# Patient Record
Sex: MALE | Race: BLACK OR AFRICAN AMERICAN | NOT HISPANIC OR LATINO | Employment: UNEMPLOYED | URBAN - METROPOLITAN AREA
[De-identification: names, ages, dates, MRNs, and addresses within clinical notes are randomized per-mention and may not be internally consistent; named-entity substitution may affect disease eponyms.]

---

## 2018-12-21 ENCOUNTER — OCCUPATIONAL MEDICINE (OUTPATIENT)
Dept: URGENT CARE | Facility: CLINIC | Age: 23
End: 2018-12-21
Payer: COMMERCIAL

## 2018-12-21 VITALS
DIASTOLIC BLOOD PRESSURE: 70 MMHG | HEIGHT: 68 IN | HEART RATE: 86 BPM | TEMPERATURE: 97.9 F | RESPIRATION RATE: 14 BRPM | BODY MASS INDEX: 33.34 KG/M2 | WEIGHT: 220 LBS | SYSTOLIC BLOOD PRESSURE: 126 MMHG

## 2018-12-21 DIAGNOSIS — S90.32XA CONTUSION OF LEFT FOOT, INITIAL ENCOUNTER: ICD-10-CM

## 2018-12-21 PROCEDURE — 99202 OFFICE O/P NEW SF 15 MIN: CPT | Performed by: FAMILY MEDICINE

## 2018-12-21 NOTE — LETTER
"EMPLOYEE’S CLAIM FOR COMPENSATION/ REPORT OF INITIAL TREATMENT  FORM C-4    EMPLOYEE’S CLAIM - PROVIDE ALL INFORMATION REQUESTED   First Name  Raymundo Last Name  Mahogany Birthdate                    1995                Sex  male Claim Number   Home Address  [address] Age  23 y.o. Height  1.727 m (5' 8\") Weight  99.8 kg (220 lb) [facility]     [facility] Zip  [address] Telephone  [phone] (home)    Mailing Address  [address] [facility] Zip  [address] Primary Language Spoken  English    Insurer  [facility]  Third Party   [employer]   Employee's Occupation (Job Title) When Injury or Occupational Disease Occurred      Employer's Name  [employer]  Telephone  [phone]    Employer Address  [address]  [facility]  Zip  [address]    Date of Injury  12/21/2018               Hour of Injury  4:50 PM Date Employer Notified  12/21/2018 Last Day of Work after Injury or Occupational Disease  12/21/2018 Supervisor to Whom Injury Reported  [facility]   Address or Location of Accident (if applicable)  [[place]]   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 (Be specific an answer in detail. Use additional sheet if necessary)  while operating electric pallet nickie, I turned my head while the nickie was in motion to make sure no traffic and pallet nickie spead up & ran over left foot   If you believe that you have an occupational disease, when did you first have knowledge of the disability and it relationship to your employment?  n/a Witnesses to the Accident  codyxiomara waynees      Nature of Injury or Occupational Disease  Strain  Part(s) of Body Injured or Affected  Ankle (L), ,     I certify that the above is true and correct to the best of my knowledge and " that I have provided this information in order to obtain the benefits of [place]  I hereby authorize any physician, chiropractor, surgeon, practitioner, or other person, any hospital, including [facility] or [facility] hospital, any medical service organization, any insurance company, or other institution or organization to release to each other, any medical or other information, including benefits paid or payable, pertinent to this injury or disease, except information relative to diagnosis, treatment and/or counseling for AIDS, psychological conditions, alcohol or controlled substances, for which I must give specific authorization.  A Photostat of this authorization shall be as valid as the original.     Date   Place   Employee’s Signature   THIS REPORT MUST BE COMPLETED AND MAILED WITHIN 3 WORKING DAYS OF TREATMENT   Place  [facility]  Name of Facility  [facility]   Date  12/21/2018 Diagnosis  (S90.32XA) Contusion of left foot, initial encounter Is there evidence the injured employee was under the influence of alcohol and/or another controlled substance at the time of accident?   Hour  5:56 PM Description of Injury or Disease  The encounter diagnosis was Contusion of left foot, initial encounter. No   Treatment  Walking boot, otc nsaid and ice as needed  Have you advised the patient to remain off work five days or more? No   X-Ray Findings  Negative   If Yes   From Date  To Date      From information given by the employee, together with medical evidence, can you directly connect this injury or occupational disease as job incurred?  Yes If No Full Duty  No Modified Duty  Yes   Is additional medical care by a physician indicated?  Yes If Modified Duty, Specify any Limitations / Restrictions  Walking limited to 4hr  Lifting carrying limited 25#   Do you know of any previous injury or  "disease contributing to this condition or occupational disease?                            No  Comments:prior distal tibia fracture with intact hardware on xray   Date  12/22/2018 Print Doctor’s Name Alexandre Gallegos M.D. I certify the employer’s copy of  this form was mailed on:   Address  [address], [address] Insurer’s Use Only     [facility] Zip  [address]    Provider’s Tax ID Number  [id number] Telephone  Dept: [phone]        ro-ALEXANDRE Spaulding M.D.   e-Signature: Dr. Sha Berumen, Medical Director Degree  MD        ORIGINAL-TREATING PHYSICIAN OR CHIROPRACTOR    PAGE 2-INSURER/TPA    PAGE 3-EMPLOYER    PAGE 4-EMPLOYEE             Form C-4 (rev10/07)              BRIEF DESCRIPTION OF RIGHTS AND BENEFITS  (Pursuant to [place]RS 616C.050)    Notice of Injury or Occupational Disease (Incident Report Form C-1): If an injury or occupational disease (OD) arises out of and in the  course of employment, you must provide written notice to your employer as soon as practicable, but no later than 7 days after the accident or  OD. Your employer shall maintain a sufficient supply of the required forms.    Claim for Compensation (Form C-4): If medical treatment is sought, the form C-4 is available at the place of initial treatment. A completed  \"Claim for Compensation\" (Form C-4) must be filed within 90 days after an accident or OD. The treating physician or chiropractor must,  within 3 working days after treatment, complete and mail to the employer, the employer's insurer and third-party , the Claim for  Compensation.    Medical Treatment: If you require medical treatment for your on-the-job injury or OD, you may be required to select a physician or  chiropractor from a list provided by your workers’ compensation insurer, if it has contracted with an Organization for Managed Care (MCO) or  Preferred Provider Organization (PPO) or providers of health care. If your employer has not entered into a " contract with an MCO or PPO, you  may select a physician or chiropractor from the Panel of Physicians and Chiropractors. Any medical costs related to your industrial injury or  OD will be paid by your insurer.    Temporary Total Disability (TTD): If your doctor has certified that you are unable to work for a period of at least 5 consecutive days, or 5  cumulative days in a 20-day period, or places restrictions on you that your employer does not accommodate, you may be entitled to TTD  compensation.    Temporary Partial Disability (TPD): If the wage you receive upon reemployment is less than the compensation for TTD to which you are  entitled, the insurer may be required to pay you TPD compensation to make up the difference. TPD can only be paid for a maximum of 24  months.    Permanent Partial Disability (PPD): When your medical condition is stable and there is an indication of a PPD as a result of your injury or  OD, within 30 days, your insurer must arrange for an evaluation by a rating physician or chiropractor to determine the degree of your PPD. The  amount of your PPD award depends on the date of injury, the results of the PPD evaluation and your age and wage.    Permanent Total Disability (PTD): If you are medically certified by a treating physician or chiropractor as permanently and totally disabled  and have been granted a PTD status by your insurer, you are entitled to receive monthly benefits not to exceed 66 2/3% of your average  monthly wage. The amount of your PTD payments is subject to reduction if you previously received a PPD award.    Vocational Rehabilitation Services: You may be eligible for vocational rehabilitation services if you are unable to return to the job due to a  permanent physical impairment or permanent restrictions as a result of your injury or occupational disease.    Transportation and Per Alison Reimbursement: You may be eligible for travel expenses and per alison associated with  medical treatment.    Reopening: You may be able to reopen your claim if your condition worsens after claim closure.    Appeal Process: If you disagree with a written determination issued by the insurer or the insurer does not respond to your request, you may  appeal to the Department of Administration, , by following the instructions contained in your determination letter. You must  appeal the determination within 70 days from the date of the determination letter at [address], [address], [place], [place]  [address], or [address], [place], [place] [address]. If you disagree with the  decision, you may appeal to the  Department of Administration, . You must file your appeal within 30 days from the date of the  decision  letter at [address], [address], [place], [place] [address], or [address], [place], [place] [address]. If you  disagree with a decision of an , you may file a petition for judicial review with the District Court. You must do so within 30  days of the Appeal Officer’s decision. You may be represented by an  at your own expense or you may contact the [facility] for possible  representation.    [place]  for Injured Workers (NAIW): If you disagree with a  decision, you may request that NAIW represent you  without charge at an  Hearing. For information regarding denial of benefits, you may contact the [facility] at: [address], [address], [place] [address], [phone], or [address], [address], [place] [address], [phone]    To File a Complaint with the Division: If you wish to file a complaint with the  of the Division of Industrial Relations (DIR),  please contact the Workers’ Compensation Section, [address], [address], [place], [place] [address], telephone [phone],  or  [address], [address], [place], [place] [address], telephone [phone].    For assistance with Workers’ Compensation Issues: you may contact the Office of the Governor Consumer Health Assistance, [address] [place] [address], [address], [place], [place] [address], Toll Free 1-122.477.3204, Web site: http://[facility].Count includes the [facility].[place]., E-mail  [email or web address].Count includes the [facility].[place].                                                                                                                                                                                                                                   __________________________________________________________________                                                                   _________________                Employee Name / Signature                                                                                                                                                       Date                                                                                                                                                                                                     D-2 (rev. 10/07)

## 2018-12-22 ENCOUNTER — HOSPITAL ENCOUNTER (OUTPATIENT)
Dept: RADIOLOGY | Facility: MEDICAL CENTER | Age: 23
End: 2018-12-22
Attending: FAMILY MEDICINE
Payer: COMMERCIAL

## 2018-12-22 DIAGNOSIS — S99.922A FOOT INJURY, LEFT, INITIAL ENCOUNTER: ICD-10-CM

## 2018-12-22 PROCEDURE — 73630 X-RAY EXAM OF FOOT: CPT | Mod: LT

## 2018-12-26 ENCOUNTER — OCCUPATIONAL MEDICINE (OUTPATIENT)
Dept: URGENT CARE | Facility: PHYSICIAN GROUP | Age: 23
End: 2018-12-26
Payer: COMMERCIAL

## 2018-12-26 VITALS
WEIGHT: 220 LBS | SYSTOLIC BLOOD PRESSURE: 96 MMHG | HEART RATE: 76 BPM | RESPIRATION RATE: 16 BRPM | OXYGEN SATURATION: 95 % | DIASTOLIC BLOOD PRESSURE: 60 MMHG | HEIGHT: 68 IN | TEMPERATURE: 98.2 F | BODY MASS INDEX: 33.34 KG/M2

## 2018-12-26 DIAGNOSIS — S90.32XD CONTUSION OF LEFT FOOT, SUBSEQUENT ENCOUNTER: ICD-10-CM

## 2018-12-26 PROCEDURE — 99213 OFFICE O/P EST LOW 20 MIN: CPT | Mod: 29 | Performed by: PHYSICIAN ASSISTANT

## 2018-12-26 NOTE — PATIENT INSTRUCTIONS
Contusion  Introduction  A contusion is a deep bruise. Contusions happen when an injury causes bleeding under the skin. Symptoms of bruising include pain, swelling, and discolored skin. The skin may turn blue, purple, or yellow.  Follow these instructions at home:  · Rest the injured area.  · If told, put ice on the injured area.  ¨ Put ice in a plastic bag.  ¨ Place a towel between your skin and the bag.  ¨ Leave the ice on for 20 minutes, 2-3 times per day.  · If told, put light pressure (compression) on the injured area using an elastic bandage. Make sure the bandage is not too tight. Remove it and put it back on as told by your doctor.  · If possible, raise (elevate) the injured area above the level of your heart while you are sitting or lying down.  · Take over-the-counter and prescription medicines only as told by your doctor.  Contact a doctor if:  · Your symptoms do not get better after several days of treatment.  · Your symptoms get worse.  · You have trouble moving the injured area.  Get help right away if:  · You have very bad pain.  · You have a loss of feeling (numbness) in a hand or foot.  · Your hand or foot turns pale or cold.  This information is not intended to replace advice given to you by your health care provider. Make sure you discuss any questions you have with your health care provider.  Document Released: 06/05/2009 Document Revised: 05/25/2017 Document Reviewed: 05/04/2016  © 2017 Elsevier     - - -

## 2018-12-26 NOTE — LETTER
[facility] [address] [address]  Phone:  [phone] - Fax:  [phone]   [facility] Progress Report and Disability Certification  Date of Service: 12/26/2018   No Show:  No  Date / Time of Next Visit: 1/2/2019   Claim Information   Patient Name: Raymundo Vides  Claim Number:     Employer: [employer]  Date of Injury: 12/21/2018     Insurer / TPA: [employer]  ID / SSN:     Occupation:   Diagnosis: The encounter diagnosis was Contusion of left foot, subsequent encounter.    Medical Information   Related to Industrial Injury? Yes    Subjective Complaints:  DOI: 12/21/18. Left foot ran over by pallgilberto fu. 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 . The patient was seen in urgent care on the date of injury.  X-ray was negative for fracture.  He was placed in a boot and given work restrictions.  He reports that he has been wearing the boot full-time.  He feels that the foot is improved about 75%.  After standing for approximately 4 hours he will begin to experience pain.  He is no longer taking any over-the-counter medication.   Objective Findings: Left foot: No obvious deformity.  Mild soft tissue swelling over the lateral aspect.  Mild tenderness over the anterior talofibular ligament.  No bony tenderness.  Negative anterior drawer.  Distal neurovascular intact   Pre-Existing Condition(s):     Assessment:   Condition Improved    Status: Additional Care Required  Permanent Disability:No    Plan:      Diagnostics: X-ray    Comments:  X-ray taken on 12/21/18 was without fracture    Disability Information   Status: Released to Restricted Duty    From:  12/26/2018  Through: 1/2/2019 Restrictions are: Temporary   Physical Restrictions   Sitting:    Standing:  < or = to 4 hrs/day Stooping:    Bending:      Squatting:     Walking:  < or = to 4 hrs/day Climbing:    Pushing:      Pulling:    Other:    Reaching Above Shoulder (L):   Reaching Above Shoulder (R):       Reaching Below Shoulder (L):    Reaching Below Shoulder (R):      Not to exceed Weight Limits   Carrying(hrs):   Weight Limit(lb): < or = to 25 pounds Lifting(hrs):   Weight  Limit(lb): < or = to 25 pounds   Comments: Patient reports approximate 75% improvement but not complete resolution of symptoms.  He will remain in the boot.  Work restrictions will remain the same.  Patient will return for follow-up in 1 week.    Repetitive Actions   Hands: i.e. Fine Manipulations from Grasping:     Feet: i.e. Operating Foot Controls: 0 hrs/day  Comments:Restriction to left foot only   Driving / Operate Machinery:     Physician Name: Ofelia Medrano P.A.-C. Physician Signature: OFELIA Sagastume P.A.-C. e-Signature: Dr. Sha Berumen, Medical Director   Clinic Name / Location: [address] [address] Clinic Phone Number: Dept: [phone]   Appointment Time: 11:00 Am Visit Start Time: 11:21 AM   Check-In Time:  11:07 Am Visit Discharge Time:  12:10 PM   Original-Treating Physician or Chiropractor    Page 2-Insurer/TPA    Page 3-Employer    Page 4-Employee

## 2018-12-26 NOTE — PROGRESS NOTES
"Subjective:   Raymundo Vides is a 23 y.o. male who presents for Ankle Injury (WC/FV ankle strain)      DOI: 12/21/18. Left foot ran over by naveen fu. 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 . The patient was seen in urgent care on the date of injury.  X-ray was negative for fracture.  He was placed in a boot and given work restrictions.  He reports that he has been wearing the boot full-time.  He feels that the foot is improved about 75%.  After standing for approximately 4 hours he will begin to experience pain.  He is no longer taking any over-the-counter medication.Ankle Injury        Review of Systems   Musculoskeletal: Positive for joint pain.   All other systems reviewed and are negative.       Objective:   BP (!) 96/60 (BP Location: Left arm, Patient Position: Sitting, BP Cuff Size: Adult)   Pulse 76   Temp 36.8 °C (98.2 °F) (Temporal)   Resp 16   Ht 1.727 m (5' 8\")   Wt 99.8 kg (220 lb)   SpO2 95%   BMI 33.45 kg/m²   Physical Exam   Constitutional: He is oriented to person, place, and time. He appears well-developed and well-nourished.   HENT:   Head: Normocephalic and atraumatic.   Right Ear: External ear normal.   Left Ear: External ear normal.   Nose: Nose normal.   Mouth/Throat: Oropharynx is clear and moist.   Eyes: Pupils are equal, round, and reactive to light. Conjunctivae and EOM are normal.   Neck: Normal range of motion. Neck supple.   Cardiovascular: Normal rate, regular rhythm and normal heart sounds.    Pulses:       Dorsalis pedis pulses are 2+ on the left side.        Posterior tibial pulses are 2+ on the left side.   Pulmonary/Chest: Effort normal and breath sounds normal. No respiratory distress.   Abdominal: There is no tenderness.   Musculoskeletal: Normal range of motion.        Left foot: There is tenderness and swelling. There is no bony tenderness, no crepitus and no deformity. "        Feet:    Neurological: He is alert and oriented to person, place, and time.   Skin: Skin is warm and dry. No rash noted.   Psychiatric: He has a normal mood and affect. Judgment normal.     Left foot: No obvious deformity.  Mild soft tissue swelling over the lateral aspect.  Mild tenderness over the anterior talofibular ligament.  No bony tenderness.  Negative anterior drawer.  Distal neurovascular intact    Assessment/Plan:     1. Contusion of left foot, subsequent encounter    Patient reports approximate 75% improvement but not complete resolution of symptoms.  He will remain in the boot.  Work restrictions will remain the same.  Patient will return for follow-up in 1 week. Report called to Nahum stevens [facility].     Differential diagnosis, natural history, supportive care, and indications for immediate follow-up discussed.    If not improving in 3-5 days, F/U with PCP or return to  or sooner if worsens  Strict ER precautions given.    Please note that this note was created using voice recognition speech to text software. Every effort has been made to correct obvious errors.  However, I expect there are errors of grammar and possibly context that were not discovered prior to finalizing the note

## 2018-12-31 ASSESSMENT — ENCOUNTER SYMPTOMS
ROS SKIN COMMENTS: NO ABRASION OR LACERATION
SENSORY CHANGE: 0
FOCAL WEAKNESS: 0

## 2018-12-31 NOTE — PROGRESS NOTES
"Subjective:      Raymundo Vides is a 23 y.o. male who presents with Ankle Injury (x LT ankle pain)      DOI: 12/21/2018  Left foot ran over by naveen fu. 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 Pain severity 3/10 at rest. Moderate with ambulation. Limping. Prior fracture and surgery of left foot when he was 12 or 14y/o. No laceration or abrasion. No OTC medications.      HPI    Review of Systems   Skin:        No abrasion or laceration     Neurological: Negative for sensory change and focal weakness.          Objective:     /70 (BP Location: Left arm, Patient Position: Sitting, BP Cuff Size: Large adult)   Pulse 86   Temp 36.6 °C (97.9 °F) (Temporal)   Resp 14   Ht 1.727 m (5' 8\")   Wt 99.8 kg (220 lb)   BMI 33.45 kg/m²      Physical Exam   Constitutional: He is oriented to person, place, and time. He appears well-developed and well-nourished. No distress.   HENT:   Head: Normocephalic and atraumatic.   Neurological: He is alert and oriented to person, place, and time.   Skin: Skin is warm and dry.       Left foot: tender proximal/dorsal aspect. Mild STS. Skin intact. No malleolar tenderness. Ankle joint stable.        Assessment/Plan:   X-ray per radiology:  No acute osseous abnormality.    1. Contusion of left foot, initial encounter    Patient was placed in a walking boot the evening prior to x-ray.  He may continue to use this as needed.  Relative rest, ice, NSAID.  Work restrictions on [address].      "

## 2019-01-02 ENCOUNTER — OCCUPATIONAL MEDICINE (OUTPATIENT)
Dept: URGENT CARE | Facility: PHYSICIAN GROUP | Age: 24
End: 2019-01-02
Payer: COMMERCIAL

## 2019-01-02 VITALS
HEART RATE: 80 BPM | BODY MASS INDEX: 32.08 KG/M2 | RESPIRATION RATE: 20 BRPM | DIASTOLIC BLOOD PRESSURE: 74 MMHG | OXYGEN SATURATION: 98 % | TEMPERATURE: 97 F | SYSTOLIC BLOOD PRESSURE: 118 MMHG | WEIGHT: 211 LBS

## 2019-01-02 DIAGNOSIS — S90.32XA CONTUSION OF LEFT FOOT, INITIAL ENCOUNTER: ICD-10-CM

## 2019-01-02 PROCEDURE — 99212 OFFICE O/P EST SF 10 MIN: CPT | Performed by: FAMILY MEDICINE

## 2019-01-02 NOTE — LETTER
[facility] [address] [address]  Phone:  [phone] - Fax:  [phone]   [facility] Progress Report and Disability Certification  Date of Service: 1/2/2019   No Show:  No  Date / Time of Next Visit: 1/6/2019   Claim Information   Patient Name: Raymundo Vides  Claim Number:     Employer: [employer]  Date of Injury: 12/21/2018     Insurer / TPA: [employer]  ID / SSN:     Occupation:   Diagnosis: The encounter diagnosis was Contusion of left foot, initial encounter.    Medical Information   Related to Industrial Injury? Yes    Subjective Complaints:  Date of injury: 12/21/2018  Patient is here for follow-up on left foot contusion that happened almost 2 weeks ago.  He is feeling a lot better.  He continues to wear his boot when he is working and when he is up and about.  Denies any numbness.  Not taking any medication for pain   Objective Findings: Physical Exam   Constitutional: He is oriented to person, place, and time. He appears well-developed and well-nourished. No distress.   Eyes: No scleral icterus.   Cardiovascular: Normal rate.    Pulmonary/Chest: Effort normal. No respiratory distress.   Musculoskeletal:        Left ankle: Normal.        Left foot: Normal. There is normal range of motion, no tenderness, no bony tenderness, no swelling, normal capillary refill, no crepitus, no deformity and no laceration.   Full range of motion left foot and ankle, no swelling, bruising or erythema noted.   Neurological: He is alert and oriented to person, place, and time.   Skin: Skin is warm. He is not diaphoretic. No pallor.   Psychiatric: He has a normal mood and affect.      Pre-Existing Condition(s):     Assessment:   Condition Improved    Status: Additional Care Required  Permanent Disability:No    Plan:      Diagnostics:      Comments:       Disability Information   Status: Released to Full Duty    From:   1/2/2019  Through: 1/6/2019 Restrictions are: Temporary   Physical Restrictions   Sitting:    Standing:    Stooping:    Bending:      Squatting:    Walking:    Climbing:    Pushing:      Pulling:    Other:    Reaching Above Shoulder (L):   Reaching Above Shoulder (R):       Reaching Below Shoulder (L):    Reaching Below Shoulder (R):      Not to exceed Weight Limits   Carrying(hrs):   Weight Limit(lb):   Lifting(hrs):   Weight  Limit(lb):     Comments: Patient significantly better, exam is otherwise normal  Advised to take off boot  Recommend full duty and repeat evaluation by Sunday January 6    Repetitive Actions   Hands: i.e. Fine Manipulations from Grasping:     Feet: i.e. Operating Foot Controls:     Driving / Operate Machinery:     Physician Name: Raffaele Hahn M.D. Physician Signature: RAFFAELE Grace M.D. e-Signature: Dr. Sha Berumen, Medical Director   Clinic Name / Location: [address] [address] Clinic Phone Number: Dept: [phone]   Appointment Time: 6:40 Pm Visit Start Time: 6:38 PM   Check-In Time:  6:22 Pm Visit Discharge Time:  7:14 PM   Original-Treating Physician or Chiropractor    Page 2-Insurer/TPA    Page 3-Employer    Page 4-Employee

## 2019-01-03 NOTE — PROGRESS NOTES
Physical Exam   Constitutional: He is oriented to person, place, and time. He appears well-developed and well-nourished. No distress.   Eyes: No scleral icterus.   Cardiovascular: Normal rate.    Pulmonary/Chest: Effort normal. No respiratory distress.   Musculoskeletal:        Left ankle: Normal.        Left foot: Normal. There is normal range of motion, no tenderness, no bony tenderness, no swelling, normal capillary refill, no crepitus, no deformity and no laceration.   Full range of motion left foot and ankle, no swelling, bruising or erythema noted.   Neurological: He is alert and oriented to person, place, and time.   Skin: Skin is warm. He is not diaphoretic. No pallor.   Psychiatric: He has a normal mood and affect.

## 2019-01-03 NOTE — PROGRESS NOTES
Subjective:      Raymundo Vides is a 23 y.o. male who presents with Ankle Injury (WC FV, left ankle )      Date of injury: 12/21/2018  Patient is here for follow-up on left foot contusion that happened almost 2 weeks ago.  He is feeling a lot better.  He continues to wear his boot when he is working and when he is up and about.  Denies any numbness.  Not taking any medication for pain     HPI    ROS       Objective:     /74   Pulse 80   Temp 36.1 °C (97 °F)   Resp 20   Wt 95.7 kg (211 lb)   SpO2 98%   BMI 32.08 kg/m²      Physical Exam    Physical Exam   Constitutional: He is oriented to person, place, and time. He appears well-developed and well-nourished. No distress.   Eyes: No scleral icterus.   Cardiovascular: Normal rate.    Pulmonary/Chest: Effort normal. No respiratory distress.   Musculoskeletal:        Left ankle: Normal.        Left foot: Normal. There is normal range of motion, no tenderness, no bony tenderness, no swelling, normal capillary refill, no crepitus, no deformity and no laceration.   Full range of motion left foot and ankle, no swelling, bruising or erythema noted.   Neurological: He is alert and oriented to person, place, and time.   Skin: Skin is warm. He is not diaphoretic. No pallor.   Psychiatric: He has a normal mood and affect.          Assessment/Plan:     1. Contusion of left foot, initial encounter    Significantly improved  Recommend taking off the boot  Recommend also a trial of full duty and follow-up on January 6 for repeat evaluation

## 2019-01-07 ENCOUNTER — OCCUPATIONAL MEDICINE (OUTPATIENT)
Dept: URGENT CARE | Facility: PHYSICIAN GROUP | Age: 24
End: 2019-01-07
Payer: COMMERCIAL

## 2019-01-07 VITALS
TEMPERATURE: 97 F | RESPIRATION RATE: 18 BRPM | SYSTOLIC BLOOD PRESSURE: 110 MMHG | BODY MASS INDEX: 32.54 KG/M2 | HEART RATE: 68 BPM | WEIGHT: 214 LBS | OXYGEN SATURATION: 96 % | DIASTOLIC BLOOD PRESSURE: 78 MMHG

## 2019-01-07 DIAGNOSIS — S90.32XD CONTUSION OF LEFT FOOT, SUBSEQUENT ENCOUNTER: ICD-10-CM

## 2019-01-07 PROCEDURE — 99213 OFFICE O/P EST LOW 20 MIN: CPT | Mod: 29 | Performed by: PHYSICIAN ASSISTANT

## 2019-01-07 NOTE — PROGRESS NOTES
Subjective:      Raymundo Vides is a 23 y.o. male who presents with Ankle Injury (WC FV left ankle)      DOI: 12/21/2018  Left foot ran over by [employer]. 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 X-rays were negative for fracture.  Patient returns to clinic today stating: In general he is doing well.  He approximate 80% improved from time of injury.  He notes remote history of childhood fracture to left ankle that did have some surgical intervention.  He denies issues of pain or limitation with this ankle since.  He denies using ice elevation or over-the-counter medications lately for this.  He feels as though it is improving.  He denies other current employment.     Ankle Injury          ROS       Objective:     /78   Pulse 68   Temp 36.1 °C (97 °F)   Resp 18   Wt 97.1 kg (214 lb)   SpO2 96%   BMI 32.54 kg/m²      Physical Exam    Gen: AOx3; Head: NC AT; Eyes: PERRLA/EOM; Lungs: NLR; Cardiac: RR by periph pulse exam; left foot/ankle: Grossly normal no erythema ecchymosis effusions or edema noted, full active range of motion, no bony tenderness to palpation; neuro: N VID, brisk capillary refill, +2 dorsalis pedis pulse palpated, nonantalgic gait       Assessment/Plan:     1. Contusion of left foot, subsequent encounter  Continue full duty without restriction, OTC Tylenol/NSAIDs when painful, ice and elevate regularly, follow-up in 1 week for likely MMI

## 2019-01-07 NOTE — LETTER
[facility] [address] [address]  Phone:  [phone] - Fax:  [phone]   [facility] Progress Report and Disability Certification  Date of Service: 1/7/2019   No Show:  No  Date / Time of Next Visit: 1/14/2019   Claim Information   Patient Name: Raymundo Vides  Claim Number:     Employer: [employer] \ Date of Injury: 12/21/2018     Insurer / TPA: [employer] \ ID / SSN:     Occupation:  \ Diagnosis: The encounter diagnosis was Contusion of left foot, subsequent encounter.    Medical Information   Related to Industrial Injury? Yes \   Subjective Complaints:  DOI: 12/21/2018  Left foot ran over by [employer]. 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 X-rays were negative for fracture.  Patient returns to clinic today stating: In general he is doing well.  He approximate 80% improved from time of injury.  He notes remote history of childhood fracture to left ankle that did have some surgical intervention.  He denies issues of pain or limitation with this ankle since.  He denies using ice elevation or over-the-counter medications lately for this.  He feels as though it is improving.  He denies other current employment.   Objective Findings: Gen: AOx3; Head: NC AT; Eyes: PERRLA/EOM; Lungs: NLR; Cardiac: RR by periph pulse exam; left foot/ankle: Grossly normal no erythema ecchymosis effusions or edema noted, full active range of motion, no bony tenderness to palpation; neuro: N VID, brisk capillary refill, +2 dorsalis pedis pulse palpated, nonantalgic gait   Pre-Existing Condition(s):     Assessment:   Initial Visit    Status: Additional Care Required  Permanent Disability:No    Plan:   Comments:Continue full duty without restriction, OTC Tylenol/NSAIDs when painful, ice and elevate regularly, follow-up in 1 week for likely  MMI     Diagnostics:      Comments:  Continue full duty without restriction, OTC Tylenol/NSAIDs when painful, ice and elevate regularly, follow-up in 1 week for likely MMI     Disability Information   Status: Released to Full Duty    From:  1/7/2019  Through: 1/14/2019 Restrictions are:     Physical Restrictions   Sitting:    Standing:    Stooping:    Bending:      Squatting:    Walking:    Climbing:    Pushing:      Pulling:    Other:    Reaching Above Shoulder (L):   Reaching Above Shoulder (R):       Reaching Below Shoulder (L):    Reaching Below Shoulder (R):      Not to exceed Weight Limits   Carrying(hrs):   Weight Limit(lb):   Lifting(hrs):   Weight  Limit(lb):     Comments: Continue full duty without restriction, OTC Tylenol/NSAIDs when painful, ice and elevate regularly, follow-up in 1 week for likely MMI     Repetitive Actions   Hands: i.e. Fine Manipulations from Grasping:     Feet: i.e. Operating Foot Controls:     Driving / Operate Machinery:     Physician Name: Horace Lima P.A.-C. Physician Signature: HORACE Viramontes P.A.-C. e-Signature: Dr. Sha Berumen, Medical Director   Clinic Name / Location: [facility] [address] [address] Clinic Phone Number: Dept: [phone]   Appointment Time: 1:00 Pm Visit Start Time: 1:41 PM   Check-In Time:  12:57 Pm Visit Discharge Time:  2:20PM   Original-Treating Physician or Chiropractor    Page 2-Insurer/TPA    Page 3-Employer    Page 4-Employee

## 2019-01-15 ENCOUNTER — OCCUPATIONAL MEDICINE (OUTPATIENT)
Dept: URGENT CARE | Facility: PHYSICIAN GROUP | Age: 24
End: 2019-01-15
Payer: COMMERCIAL

## 2019-01-15 VITALS
HEART RATE: 88 BPM | WEIGHT: 214 LBS | OXYGEN SATURATION: 96 % | TEMPERATURE: 98 F | SYSTOLIC BLOOD PRESSURE: 110 MMHG | DIASTOLIC BLOOD PRESSURE: 80 MMHG | BODY MASS INDEX: 32.54 KG/M2

## 2019-01-15 DIAGNOSIS — S90.32XD CONTUSION OF LEFT FOOT, SUBSEQUENT ENCOUNTER: ICD-10-CM

## 2019-01-15 PROCEDURE — 99213 OFFICE O/P EST LOW 20 MIN: CPT | Performed by: EMERGENCY MEDICINE

## 2019-01-15 ASSESSMENT — ENCOUNTER SYMPTOMS
EYE REDNESS: 0
CHILLS: 0
TINGLING: 0
COUGH: 0
FEVER: 0
FOCAL WEAKNESS: 0
NERVOUS/ANXIOUS: 0
EYE DISCHARGE: 0

## 2019-01-15 NOTE — PROGRESS NOTES
Subjective:      Raymundo Vides is a 23 y.o. male who presents with Ankle Injury (WC FV left ankle)      Date of injury 12/21/2018.  Patient is a 23-year-old male here for follow-up after injury to his left foot when he was run over by a pallet nickie.  Patient has been followed in the urgent care since the accident last seen on 1/7/2019.  At that time he was 80% improved he states that he is now 95% improved previous x-rays have been negative for fracture dislocation.  Patient is currently doing full duty stating that the only discomfort is when jumping down on his feet     HPI  PMH:  has no past medical history on file.  MEDS: No current outpatient prescriptions on file.  ALLERGIES: No Known Allergies  SURGHX: No past surgical history on file.  SOCHX:  reports that he has been smoking.  He has never used smokeless tobacco. He reports that he drinks alcohol. He reports that he does not use drugs.  FH: Reviewed with patient, not pertinent to this visit.   Review of Systems   Constitutional: Negative for chills and fever.   Eyes: Negative for discharge and redness.   Respiratory: Negative for cough.    Cardiovascular: Negative for chest pain.   Musculoskeletal: Negative for joint pain.        Patient states he has 95% resolution of his left foot pain.   Skin: Negative for rash.   Neurological: Negative for tingling and focal weakness.   Psychiatric/Behavioral: The patient is not nervous/anxious.           Objective:     /80   Pulse 88   Temp 36.7 °C (98 °F)   Wt 97.1 kg (214 lb)   SpO2 96%   BMI 32.54 kg/m²      Physical Exam    Vital signs are 110/80, pulse of 88 and regular, temperature is 36.7 with a pulse oximetry 96%.  Examination of patient's left ankle reveals normal exam examination of patient's left foot shows no evidence of any edema patient has good range of motion able to ambulate without difficulty.  Normal neurovascular exam of the ankle and       Assessment/Plan:     1. Contusion of left  foot, subsequent encounter      Please refer to the [place] LD-39 form patient be reevaluated in 1 week while on full duty.

## 2019-01-15 NOTE — LETTER
[facility] [address] [address]  Phone:  [phone] - Fax:  [phone]   [facility] Progress Report and Disability Certification  Date of Service: 1/15/2019   No Show:  No  Date / Time of Next Visit: 1/22/2019   Claim Information   Patient Name: Raymundo Vides  Claim Number:     Employer: [employer]  Date of Injury: 12/21/2018     Insurer / TPA: [employer]  ID / SSN:     Occupation:   Diagnosis: The encounter diagnosis was Contusion of left foot, subsequent encounter.    Medical Information   Related to Industrial Injury? Yes    Subjective Complaints:  Date of injury 12/21/2018.  Patient is a 23-year-old male here for follow-up after injury to his left foot when he was run over by a pallet nickie.  Patient has been followed in the urgent care since the accident last seen on 1/7/2019.  At that time he was 80% improved he states that he is now 95% improved previous x-rays have been negative for fracture dislocation.  Patient is currently doing full duty stating that the only discomfort is when jumping down on his feet   Objective Findings: Vital signs are 110/80, pulse of 88 and regular, temperature is 36.7 with a pulse oximetry 96%.  Examination of patient's left ankle reveals normal exam examination of patient's left foot shows no evidence of any edema patient has good range of motion able to ambulate without difficulty.  Normal neurovascular exam of the ankle and   Pre-Existing Condition(s): Patient had fracture of ankle as a child without any residual.   Assessment:   Condition Improved    Status: Additional Care Required  Permanent Disability:No    Plan:   Comments:Patient will use over-the-counter NSAIDs for any discomfort.    Diagnostics:   Comments:Previous x-rays were negative.    Comments:  Patient will progress to full duty be reevaluated in a week.    Disability Information   Status: Released to Full  Duty    From:  1/15/2019  Through: 1/22/2019 Restrictions are: Temporary   Physical Restrictions   Sitting:    Standing:    Stooping:    Bending:      Squatting:    Walking:    Climbing:    Pushing:      Pulling:    Other:    Reaching Above Shoulder (L):   Reaching Above Shoulder (R):       Reaching Below Shoulder (L):    Reaching Below Shoulder (R):      Not to exceed Weight Limits   Carrying(hrs):   Weight Limit(lb):   Lifting(hrs):   Weight  Limit(lb):     Comments: Patient will be reassessed in a week while doing full duty to see whether his resolution of symptoms has improved from 95% to complete resolution    Repetitive Actions   Hands: i.e. Fine Manipulations from Grasping:     Feet: i.e. Operating Foot Controls:     Driving / Operate Machinery:     Physician Name: Germain Combs M.D. Physician Signature: SUMA Castillo M.D. e-Signature: Dr. Sha Berumen, Medical Director   Clinic Name / Location: [address] [address] Clinic Phone Number: Dept: [phone]   Appointment Time: 1:00 Pm Visit Start Time: 1:11 PM   Check-In Time:  1:07 Pm Visit Discharge Time:  1:41 PM   Original-Treating Physician or Chiropractor    Page 2-Insurer/TPA    Page 3-Employer    Page 4-Employee

## 2019-01-27 ENCOUNTER — OCCUPATIONAL MEDICINE (OUTPATIENT)
Dept: URGENT CARE | Facility: PHYSICIAN GROUP | Age: 24
End: 2019-01-27
Payer: COMMERCIAL

## 2019-01-27 VITALS
RESPIRATION RATE: 16 BRPM | BODY MASS INDEX: 32.69 KG/M2 | HEART RATE: 75 BPM | SYSTOLIC BLOOD PRESSURE: 102 MMHG | DIASTOLIC BLOOD PRESSURE: 74 MMHG | OXYGEN SATURATION: 97 % | WEIGHT: 215 LBS | TEMPERATURE: 98.5 F

## 2019-01-27 DIAGNOSIS — S90.32XD CONTUSION OF LEFT FOOT, SUBSEQUENT ENCOUNTER: ICD-10-CM

## 2019-01-27 PROCEDURE — 99213 OFFICE O/P EST LOW 20 MIN: CPT | Mod: 29 | Performed by: NURSE PRACTITIONER

## 2019-01-27 NOTE — LETTER
[facility] [address] [address]  Phone:  [phone] - Fax:  [phone]   [facility] Progress Report and Disability Certification  Date of Service: 1/27/2019   No Show:  No  Date / Time of Next Visit:     Claim Information   Patient Name: Raymundo Vides  Claim Number:     Employer: [employer]  Date of Injury: 12/21/2018     Insurer / TPA: [employer]    ID / SSN:     Occupation:     Diagnosis: The encounter diagnosis was Contusion of left foot, subsequent encounter.    Medical Information   Related to Industrial Injury? Yes      Subjective Complaints:  DOI 12/21/2018 Follow up. Patient states much improved with no complaints of pain. Has not taken Ibuprofen since last visit. Full duty tolerated well. Would like to be released.   Objective Findings: Left ankle: ROM normal. Muscular strength 5/5. No obvious redness, swelling or tenderness. Normal gait. Denies numbness or tingling to toes.   Pre-Existing Condition(s):     Assessment:   Condition Improved    Status: Discharged /  MMI  Permanent Disability:No    Plan:      Diagnostics:      Comments:       Disability Information   Status: Released to Full Duty    From:     Through:   Restrictions are:     Physical Restrictions   Sitting:    Standing:    Stooping:    Bending:      Squatting:    Walking:    Climbing:    Pushing:      Pulling:    Other:    Reaching Above Shoulder (L):   Reaching Above Shoulder (R):       Reaching Below Shoulder (L):    Reaching Below Shoulder (R):      Not to exceed Weight Limits   Carrying(hrs):   Weight Limit(lb):   Lifting(hrs):   Weight  Limit(lb):     Comments: Released to full duty/ discharged    Repetitive Actions   Hands: i.e. Fine Manipulations from Grasping:     Feet: i.e. Operating Foot Controls:     Driving / Operate Machinery:     Physician Name: ALLYSSA Aguirre Physician Signature: ISAIAH Scott  e-Signature: Dr. Sha Berumen, Medical Director   Clinic Name / Location: [address] [address] Clinic Phone Number: Dept: [phone]   Appointment Time: 3:00 Pm Visit Start Time: 4:06 PM   Check-In Time:  3:06 Pm Visit Discharge Time:    4:36 PM   Original-Treating Physician or Chiropractor    Page 2-Insurer/TPA    Page 3-Employer    Page 4-Employee

## 2019-01-27 NOTE — LETTER
[facility] [address] [address]  Phone:  [phone] - Fax:  [phone]   [facility] Progress Report and Disability Certification  Date of Service: 1/27/2019   No Show:  No  Date / Time of Next Visit:     Claim Information   Patient Name: Raymundo Vides  Claim Number:     Employer: [employer]  Date of Injury: 12/21/2018     Insurer / TPA: [employer]  ID / SSN:     Occupation:   Diagnosis: The encounter diagnosis was Contusion of left foot, subsequent encounter.    Medical Information   Related to Industrial Injury? Yes    Subjective Complaints:  DOI 12/21/2018 Follow up. Patient states much improved with no complaints of pain. Has not taken Ibuprofen since last visit. Full duty tolerated well. Would like to be released.   Objective Findings: Left ankle: ROM normal. Muscular strength 5/5. No obvious redness, swelling or tenderness. Normal gait. Denies numbness or tingling to toes.   Pre-Existing Condition(s):     Assessment:   Condition Improved    Status: Discharged /  MMI  Permanent Disability:No    Plan:      Diagnostics:      Comments:       Disability Information   Status: Released to Full Duty    From:     Through:   Restrictions are:     Physical Restrictions   Sitting:    Standing:    Stooping:    Bending:      Squatting:    Walking:    Climbing:    Pushing:      Pulling:    Other:    Reaching Above Shoulder (L):   Reaching Above Shoulder (R):       Reaching Below Shoulder (L):    Reaching Below Shoulder (R):      Not to exceed Weight Limits   Carrying(hrs):   Weight Limit(lb):   Lifting(hrs):   Weight  Limit(lb):     Comments: Released to full duty/ discharged    Repetitive Actions   Hands: i.e. Fine Manipulations from Grasping:     Feet: i.e. Operating Foot Controls:     Driving / Operate Machinery:     Physician Name: ALLYSSA Aguirre Physician Signature: ISAIAH Scott  e-Signature: Dr. Sha Berumen, Medical Director   Clinic Name / Location: [address] [address] Clinic Phone Number: Dept: [phone]   Appointment Time: 3:00 Pm Visit Start Time: 4:06 PM   Check-In Time:  3:06 Pm Visit Discharge Time:  4:36 PM   Original-Treating Physician or Chiropractor    Page 2-Insurer/TPA    Page 3-Employer    Page 4-Employee

## 2019-01-28 NOTE — PROGRESS NOTES
Subjective:   Raymundo Vides is a 23 y.o. male who presents for Ankle Injury (WC injury left ankle, feeling better)    DOI 12/21/2018 Follow up. Patient states much improved with no complaints of pain. Has not taken Ibuprofen since last visit. Full duty tolerated well. Would like to be released.   HPI   Initial HPI Copied:  DOI: 12/21/2018  Left foot ran over by [employer]. 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 Pain severity 3/10 at rest. Moderate with ambulation. Limping. Prior fracture and surgery of left foot when he was 12 or 12y/o. No laceration or abrasion. No OTC medications.      Review of Systems   Constitutional: Negative for chills, diaphoresis and fever.   Respiratory: Negative for cough, shortness of breath and wheezing.    Cardiovascular: Negative for chest pain and palpitations.   Gastrointestinal: Negative for abdominal pain, constipation, diarrhea, nausea and vomiting.   Musculoskeletal: Negative for back pain and myalgias.        Previous left foot pain   Skin: Negative for itching and rash.   Neurological: Negative for dizziness, tingling, weakness and headaches.   All other systems reviewed and are negative.      PMH:  has no past medical history on file.  MEDS: No current outpatient prescriptions on file.  ALLERGIES: No Known Allergies  SURGHX: History reviewed. No pertinent surgical history.  SOCHX:  reports that he has been smoking.  He has never used smokeless tobacco. He reports that he drinks alcohol. He reports that he does not use drugs.  FH: Family history was reviewed, no pertinent findings to report     Objective:   /74 (BP Location: Left arm, Patient Position: Sitting, BP Cuff Size: Adult)   Pulse 75   Temp 36.9 °C (98.5 °F)   Resp 16   Wt 97.5 kg (215 lb)   SpO2 97%   BMI 32.69 kg/m²   Physical Exam   Constitutional: He appears well-developed and well-nourished. No  distress.   HENT:   Head: Normocephalic.   Cardiovascular: Normal rate, regular rhythm and normal heart sounds.    Pulmonary/Chest: Effort normal and breath sounds normal. No respiratory distress. He has no decreased breath sounds. He has no wheezes.   Musculoskeletal:   Normal gait   Neurological: He is alert.   Skin: Skin is warm. No rash noted. He is not diaphoretic.   Psychiatric: He has a normal mood and affect. His behavior is normal. Judgment and thought content normal.   Vitals reviewed.    Left ankle: ROM normal. Muscular strength 5/5. No obvious redness, swelling or tenderness. Normal gait. Denies numbness or tingling to toes.   Assessment/Plan:   Assessment    1. Contusion of left foot, subsequent encounter    Since patient has no symptoms and states his trial of full duty went well - Discharged MMI and released to full duty.    Differential diagnosis, natural history, supportive care, and indications for immediate follow-up discussed.

## 2019-01-29 ASSESSMENT — ENCOUNTER SYMPTOMS
PALPITATIONS: 0
WEAKNESS: 0
HEADACHES: 0
CHILLS: 0
FEVER: 0
CONSTIPATION: 0
NAUSEA: 0
COUGH: 0
TINGLING: 0
MYALGIAS: 0
ABDOMINAL PAIN: 0
DIARRHEA: 0
VOMITING: 0
DIAPHORESIS: 0
BACK PAIN: 0
DIZZINESS: 0
SHORTNESS OF BREATH: 0
WHEEZING: 0

## 2019-07-18 ENCOUNTER — TELEPHONE (OUTPATIENT)
Dept: SCHEDULING | Facility: IMAGING CENTER | Age: 24
End: 2019-07-18

## 2019-07-29 ENCOUNTER — OFFICE VISIT (OUTPATIENT)
Dept: MEDICAL GROUP | Facility: MEDICAL CENTER | Age: 24
End: 2019-07-29
Payer: COMMERCIAL

## 2019-07-29 VITALS
TEMPERATURE: 97.3 F | HEART RATE: 77 BPM | DIASTOLIC BLOOD PRESSURE: 64 MMHG | SYSTOLIC BLOOD PRESSURE: 114 MMHG | WEIGHT: 199 LBS | HEIGHT: 68 IN | OXYGEN SATURATION: 98 % | BODY MASS INDEX: 30.16 KG/M2

## 2019-07-29 DIAGNOSIS — R53.83 FATIGUE, UNSPECIFIED TYPE: ICD-10-CM

## 2019-07-29 DIAGNOSIS — Z13.220 SCREENING, LIPID: ICD-10-CM

## 2019-07-29 DIAGNOSIS — Z78.9 HEAVY DRINKER OF ALCOHOL: ICD-10-CM

## 2019-07-29 DIAGNOSIS — R56.9 SEIZURE (HCC): ICD-10-CM

## 2019-07-29 DIAGNOSIS — K21.9 GASTROESOPHAGEAL REFLUX DISEASE WITHOUT ESOPHAGITIS: ICD-10-CM

## 2019-07-29 PROCEDURE — 99204 OFFICE O/P NEW MOD 45 MIN: CPT | Performed by: INTERNAL MEDICINE

## 2019-07-29 ASSESSMENT — PATIENT HEALTH QUESTIONNAIRE - PHQ9: CLINICAL INTERPRETATION OF PHQ2 SCORE: 0

## 2019-07-29 NOTE — PROGRESS NOTES
CC: Questionable history of seizures, alcohol and reflux.    HPI:  Raymundo presents with the following    1. Seizure (HCC)  Presents reporting 2 episodes while he was asleep witnessed by his brother.  Reports a jerking type movement.  The next morning he woke up with a stick neck and soreness and somewhat fatigued.  He did have night terrors as a child.  He had a second episode approximately 2 weeks ago.  Was evaluated in the emergency room but has not had any EEG.  He does have family members with seizures.    2. Heavy drinker of alcohol  History of heavy drinking however these were not related to cessation or alcohol.  Drinks 4-5 drinks per night.    3. Gastroesophageal reflux disease without esophagitis  Suffer from reflux no difficulty swallowing no blood in stool or black stools.  Denies any abdominal pain.      Patient Active Problem List    Diagnosis Date Noted   • Seizure (HCC) 07/29/2019   • Heavy drinker of alcohol 07/29/2019   • Gastroesophageal reflux disease without esophagitis 07/29/2019       No current outpatient prescriptions on file.     No current facility-administered medications for this visit.          Allergies as of 07/29/2019   • (No Known Allergies)        Social History     Social History   • Marital status: Single     Spouse name: N/A   • Number of children: N/A   • Years of education: N/A     Occupational History   • Not on file.     Social History Main Topics   • Smoking status: Former Smoker     Packs/day: 1.00     Years: 8.00     Types: Cigarettes     Quit date: 3/29/2019   • Smokeless tobacco: Never Used   • Alcohol use No      Comment: 2 bottles of whiskey/week    • Drug use: Yes     Types: Marijuana   • Sexual activity: Yes     Other Topics Concern   • Not on file     Social History Narrative   • No narrative on file       Family History   Problem Relation Age of Onset   • Cancer Maternal Grandmother        History reviewed. No pertinent surgical history.    ROS:  Denies any  "Headache,Chest pain,  Shortness of breath,  Abdominal pain, Changes of bowel or bladder, Lower ext edema, Fevers, Nights sweats, Weight Changes, Focal weakness or numbness.  All other systems are negative.    /64 (BP Location: Right arm, Patient Position: Sitting)   Pulse 77   Temp 36.3 °C (97.3 °F)   Ht 1.727 m (5' 8\")   Wt 90.3 kg (199 lb)   SpO2 98%   BMI 30.26 kg/m²      Physical Exam:  Gen:         Alert and oriented, No apparent distress.  HEENT:   Perrla, TM clear,  Oralpharynx no erythema or exudates.  Neck:       No Jugular venous distension, Lymphadenopathy, Thyromegaly, Bruits.  Lungs:     Clear to auscultation bilaterally  CV:          Regular rate and rhythm. No murmurs, rubs or gallops.  Abd:         Soft non tender, non distended. Normal active bowel sounds.             Ext:          No clubbing, cyanosis, edema.      Assessment and Plan.   23 y.o. male presenting with the following.     1. Seizure (HCC)  He does report biting his tongue no incontinence there is family history have referred to neurology for evaluation in the EEG.  - REFERRAL TO NEUROLOGY    2. Heavy drinker of alcohol  Recommendations for cessation    3. Gastroesophageal reflux disease without esophagitis  Lifestyle modifications Zantac as needed    4.  Screening blood work    - TSH; Future  - CBC WITH DIFFERENTIAL; Future    5. Screening, lipid    - Comp Metabolic Panel; Future  - Lipid Profile; Future  "

## 2019-09-10 ENCOUNTER — HOSPITAL ENCOUNTER (OUTPATIENT)
Dept: RADIOLOGY | Facility: MEDICAL CENTER | Age: 24
End: 2019-09-10
Attending: SPECIALIST
Payer: COMMERCIAL

## 2019-09-10 DIAGNOSIS — G40.901 PROLONGED SEIZURE (HCC): ICD-10-CM

## 2019-09-10 PROCEDURE — 700117 HCHG RX CONTRAST REV CODE 255: Performed by: SPECIALIST

## 2019-09-10 PROCEDURE — 70553 MRI BRAIN STEM W/O & W/DYE: CPT

## 2019-09-10 PROCEDURE — A9585 GADOBUTROL INJECTION: HCPCS | Performed by: SPECIALIST

## 2019-09-10 RX ORDER — GADOBUTROL 604.72 MG/ML
9 INJECTION INTRAVENOUS ONCE
Status: COMPLETED | OUTPATIENT
Start: 2019-09-10 | End: 2019-09-10

## 2019-09-10 RX ADMIN — GADOBUTROL 9 ML: 604.72 INJECTION INTRAVENOUS at 09:41

## 2020-04-14 ENCOUNTER — HOSPITAL ENCOUNTER (EMERGENCY)
Facility: MEDICAL CENTER | Age: 25
End: 2020-04-14
Attending: EMERGENCY MEDICINE

## 2020-04-14 VITALS
OXYGEN SATURATION: 99 % | WEIGHT: 190.48 LBS | BODY MASS INDEX: 28.87 KG/M2 | TEMPERATURE: 98.4 F | SYSTOLIC BLOOD PRESSURE: 121 MMHG | DIASTOLIC BLOOD PRESSURE: 67 MMHG | HEIGHT: 68 IN | HEART RATE: 90 BPM | RESPIRATION RATE: 16 BRPM

## 2020-04-14 DIAGNOSIS — S01.512A LACERATION OF TONGUE, INITIAL ENCOUNTER: ICD-10-CM

## 2020-04-14 PROCEDURE — 99283 EMERGENCY DEPT VISIT LOW MDM: CPT

## 2020-04-14 RX ORDER — AMOXICILLIN 500 MG/1
500 CAPSULE ORAL 3 TIMES DAILY
Qty: 15 CAP | Refills: 0 | Status: SHIPPED | OUTPATIENT
Start: 2020-04-14 | End: 2020-04-19

## 2020-04-14 ASSESSMENT — LIFESTYLE VARIABLES
DO YOU DRINK ALCOHOL: NO
DOES PATIENT WANT TO STOP DRINKING: NO

## 2020-04-15 NOTE — ED TRIAGE NOTES
"Chief Complaint   Patient presents with   • Wound Check     Pt bit the L side of his tongue when he had a seizure this morning. Hx of seizures but stopped taking his medication \"I didn't think it was doing anything since I was still having seizures\"      /67   Pulse 90   Temp 36.9 °C (98.4 °F) (Temporal)   Resp 16   Ht 1.727 m (5' 8\")   Wt 86.4 kg (190 lb 7.6 oz)   SpO2 99%   BMI 28.96 kg/m²     Pt ambulated into triage, VS as above, NAD, encouraged to return to the triage nurse or tech with any new complaints or symptoms.  "

## 2020-04-15 NOTE — ED PROVIDER NOTES
"ED Provider Note    CHIEF COMPLAINT  Chief Complaint   Patient presents with   • Wound Check     Pt bit the L side of his tongue when he had a seizure this morning. Hx of seizures but stopped taking his medication \"I didn't think it was doing anything since I was still having seizures\"         HPI  Raymundo Vides is a 24 y.o. male who presents to the ED with complaints of a left lateral tongue laceration.  The patient has occasional seizures he currently states he is not taking his medication because he does not feel that it is really helping.  He apparently had a seizure last night woke up this morning with a laceration to the side of his tongue.  Patient denies any fever chills nausea vomiting or any other symptoms.    REVIEW OF SYSTEMS  See HPI for further details. All other systems are negative.     PAST MEDICAL HISTORY  Past Medical History:   Diagnosis Date   • Seizure (HCC)        FAMILY HISTORY  Family History   Problem Relation Age of Onset   • Cancer Maternal Grandmother      Patient's family history has been discussed and is been found to be noncontributory to his present illness  SOCIAL HISTORY  Social History     Socioeconomic History   • Marital status: Single     Spouse name: Not on file   • Number of children: Not on file   • Years of education: Not on file   • Highest education level: Not on file   Occupational History   • Not on file   Social Needs   • Financial resource strain: Not on file   • Food insecurity     Worry: Not on file     Inability: Not on file   • Transportation needs     Medical: Not on file     Non-medical: Not on file   Tobacco Use   • Smoking status: Current Some Day Smoker     Packs/day: 1.00     Years: 8.00     Pack years: 8.00     Types: Cigarettes     Last attempt to quit: 3/29/2019     Years since quittin.0   • Smokeless tobacco: Never Used   Substance and Sexual Activity   • Alcohol use: Yes     Comment: 1-2 beer daily   • Drug use: Yes     Types: Marijuana, Inhaled " "    Comment: marijuana   • Sexual activity: Yes   Lifestyle   • Physical activity     Days per week: Not on file     Minutes per session: Not on file   • Stress: Not on file   Relationships   • Social connections     Talks on phone: Not on file     Gets together: Not on file     Attends [employer] service: Not on file     Active member of club or organization: Not on file     Attends meetings of clubs or organizations: Not on file     Relationship status: Not on file   • Intimate partner violence     Fear of current or ex partner: Not on file     Emotionally abused: Not on file     Physically abused: Not on file     Forced sexual activity: Not on file   Other Topics Concern   • Not on file   Social History Narrative   • Not on file      Trey Foster M.D.      SURGICAL HISTORY  History reviewed. No pertinent surgical history.    CURRENT MEDICATIONS  Home Medications     Reviewed by Gabby Mishra R.N. (Registered Nurse) on 04/14/20 at 1736  Med List Status: Complete   Medication Last Dose Status        Patient Jared Taking any Medications                       ALLERGIES  No Known Allergies    PHYSICAL EXAM  VITAL SIGNS: /67   Pulse 90   Temp 36.9 °C (98.4 °F) (Temporal)   Resp 16   Ht 1.727 m (5' 8\")   Wt 86.4 kg (190 lb 7.6 oz)   SpO2 99%   BMI 28.96 kg/m²   Pulse Oximetry was obtained. It showed a reading of Pulse Oximetry: 99 %.  I interpreted this as non-hypoxic.   Constitutional :  Well developed, Well nourished, No acute distress, Non-toxic appearance.   HENT: Head is normal without signs of trauma by TMs are normal.  Oral mucosa is normal.  The tongue there is a oval-shaped bite laceration to the lateral aspect of his tongue that is less than 2 cm in length.  There is no signs of malfunction to the tongue he is able to lateralize and move the tongue around without any significant gaping.  Eyes: Conjunctiva is normal  Neck: Normal range of motion, No tenderness, Supple, No stridor. "   Lymphatic: No anterior lymphadenopathy.   Cardiovascular: Normal heart rate, Normal rhythm, No murmurs, No rubs, No gallops.   Thorax & Lungs: Clear to auscultation bilaterally  Skin: Warm, Dry, No erythema, No rash.   Extremities: Intact distal pulses, No edema, No tenderness, No cyanosis, No clubbing.       RADIOLOGY/PROCEDURES  None    COURSE & MEDICAL DECISION MAKING  Pertinent Labs & Imaging studies reviewed. (See chart for details)  Patient presents emerge department for evaluation.  Clinically it is minimal laceration lateral aspect of the tongue at this point I do not feel suture repair is necessary.  Patient states that it happened last night I will give him a 5-day course of amoxicillin empirically for the mouth injury.  The patient should do mouth rinses and return as needed.  Patient is to follow-up with neurology for further evaluation and outpatient treatment of his seizure disorder to include discussion about other medications to help suppress his seizures.  Patient has been given DMV form not to drive for 90 days.  Patient understands we will do as above.    FINAL IMPRESSION  1. Laceration of tongue, initial encounter            The patient will return for new or worsening symptoms and is stable at the time of discharge.    The patient is referred to a primary physician for blood pressure management, diabetic screening, and for all other preventative health concerns.        DISPOSITION:  Patient will be discharged home in stable condition.    FOLLOW UP:  [facility]  [address] [place] [address], [address]  [facility] [address] [phone]          OUTPATIENT MEDICATIONS:  New Prescriptions    AMOXICILLIN (AMOXIL) 500 MG CAP    Take 1 Cap by mouth 3 times a day for 5 days.           Electronically signed by: David Gallegos M.D., 4/14/2020

## 2020-10-15 ENCOUNTER — TELEPHONE (OUTPATIENT)
Dept: MEDICAL GROUP | Facility: MEDICAL CENTER | Age: 25
End: 2020-10-15

## 2020-10-15 NOTE — TELEPHONE ENCOUNTER
VOICEMAIL  1. Caller Name: MaguiDr. Howell                      Call Back Number: [phone]    2. Message: Dr. Howell's office called and patient is having ankle surgery tomorrow. They have been trying to get a clearance from patient's neurologist but his office let them know today that he needs clearance from PCP. They would like a clearance sent over today. Please advise.    3. Patient approves office to leave a detailed voicemail/MyChart message: N\A

## 2020-11-24 ENCOUNTER — HOSPITAL ENCOUNTER (EMERGENCY)
Facility: MEDICAL CENTER | Age: 25
End: 2020-11-24
Attending: EMERGENCY MEDICINE
Payer: COMMERCIAL

## 2020-11-24 VITALS
OXYGEN SATURATION: 100 % | HEART RATE: 102 BPM | HEIGHT: 69 IN | WEIGHT: 190 LBS | DIASTOLIC BLOOD PRESSURE: 66 MMHG | BODY MASS INDEX: 28.14 KG/M2 | SYSTOLIC BLOOD PRESSURE: 103 MMHG | TEMPERATURE: 97 F | RESPIRATION RATE: 14 BRPM

## 2020-11-24 DIAGNOSIS — Z76.0 MEDICATION REFILL: ICD-10-CM

## 2020-11-24 DIAGNOSIS — Z91.148 NONCOMPLIANCE WITH MEDICATIONS: ICD-10-CM

## 2020-11-24 DIAGNOSIS — R56.9 SEIZURE (HCC): ICD-10-CM

## 2020-11-24 PROCEDURE — A9270 NON-COVERED ITEM OR SERVICE: HCPCS | Performed by: EMERGENCY MEDICINE

## 2020-11-24 PROCEDURE — 99284 EMERGENCY DEPT VISIT MOD MDM: CPT

## 2020-11-24 PROCEDURE — 96374 THER/PROPH/DIAG INJ IV PUSH: CPT

## 2020-11-24 PROCEDURE — 700111 HCHG RX REV CODE 636 W/ 250 OVERRIDE (IP): Performed by: EMERGENCY MEDICINE

## 2020-11-24 PROCEDURE — 700102 HCHG RX REV CODE 250 W/ 637 OVERRIDE(OP): Performed by: EMERGENCY MEDICINE

## 2020-11-24 RX ORDER — LORAZEPAM 2 MG/ML
1 INJECTION INTRAMUSCULAR ONCE
Status: COMPLETED | OUTPATIENT
Start: 2020-11-24 | End: 2020-11-24

## 2020-11-24 RX ORDER — DIVALPROEX SODIUM 500 MG/1
500 TABLET, DELAYED RELEASE ORAL 2 TIMES DAILY
Qty: 60 TAB | Refills: 0 | Status: SHIPPED | OUTPATIENT
Start: 2020-11-24 | End: 2021-01-15

## 2020-11-24 RX ORDER — ACETAMINOPHEN 325 MG/1
650 TABLET ORAL ONCE
Status: COMPLETED | OUTPATIENT
Start: 2020-11-24 | End: 2020-11-24

## 2020-11-24 RX ORDER — DIVALPROEX SODIUM 500 MG/1
500 TABLET, DELAYED RELEASE ORAL ONCE
Status: COMPLETED | OUTPATIENT
Start: 2020-11-24 | End: 2020-11-24

## 2020-11-24 RX ADMIN — ACETAMINOPHEN 650 MG: 325 TABLET, FILM COATED ORAL at 13:36

## 2020-11-24 RX ADMIN — LORAZEPAM 1 MG: 2 INJECTION INTRAMUSCULAR; INTRAVENOUS at 13:37

## 2020-11-24 RX ADMIN — DIVALPROEX SODIUM 500 MG: 500 TABLET, DELAYED RELEASE ORAL at 14:43

## 2020-11-24 SDOH — HEALTH STABILITY: MENTAL HEALTH: HOW OFTEN DO YOU HAVE A DRINK CONTAINING ALCOHOL?: 4 OR MORE TIMES A WEEK

## 2020-11-24 SDOH — HEALTH STABILITY: MENTAL HEALTH: HOW MANY STANDARD DRINKS CONTAINING ALCOHOL DO YOU HAVE ON A TYPICAL DAY?: 3 OR 4

## 2020-11-24 ASSESSMENT — PAIN DESCRIPTION - PAIN TYPE: TYPE: ACUTE PAIN

## 2020-11-24 NOTE — ED NOTES
Ativan and tylenol administered. Pt educated on the importance of not driving post seizure and ativan administration.

## 2020-11-24 NOTE — ED NOTES
Med rec updated and complete  Allergies reviewed  Pt is not sure the name of his DEPAKOTE, called [facility] @ [phone], per [facility] has not had him  any DEPAKOTE.  Called [facility] @ [phone] to verify all prescription medications. Per pharmacy pt last picked up DEPAKOTE DR 500MG 1 tablet BID on 8/2020 for 30 day supply.  Called Dr. Brenda Mars office @ [phone], per pt was only on KEPPRA 500MG 2 tablets BID.  Pt reports that he went to [facility] and received an RX for DEPAKOTE.  Pt reports that last time he took his DEPAKOTE was about a month or longer.  Pt reports no prescription medications, OTC's, or vitmains.  Pt reports no antibiotics in the last 2 weeks.

## 2020-11-24 NOTE — ED PROVIDER NOTES
ED Provider Note    CHIEF COMPLAINT  Chief Complaint   Patient presents with   • Seizure     sister witnessed 3 min tonic clonic seizure        HPI  Raymundo Vides is a 24 y.o. male who presents to the emergency department after a seizure.  The patient has a history of a seizure disorder.  He has been noncompliant with his medication.  Most recently took Depakote.  He is not taking his medication about a month.  This morning he had a witnessed 3-minute tonic-clonic seizure.  He is feeling better now.  He got a bit of a headache.  He is requesting glass of water.  Is not exactly sure what his dosages of medication are.    REVIEW OF SYSTEMS  See HPI for further details. All other systems are negative.     PAST MEDICAL HISTORY  Past Medical History:   Diagnosis Date   • Seizure (HCC)        FAMILY HISTORY  Family History   Problem Relation Age of Onset   • Cancer Maternal Grandmother        SOCIAL HISTORY  Social History     Socioeconomic History   • Marital status: Single     Spouse name: Not on file   • Number of children: Not on file   • Years of education: Not on file   • Highest education level: Not on file   Occupational History   • Not on file   Social Needs   • Financial resource strain: Not on file   • Food insecurity     Worry: Not on file     Inability: Not on file   • Transportation needs     Medical: Not on file     Non-medical: Not on file   Tobacco Use   • Smoking status: Current Some Day Smoker     Packs/day: 1.00     Years: 8.00     Pack years: 8.00     Types: Cigarettes     Last attempt to quit: 3/29/2019     Years since quittin.6   • Smokeless tobacco: Never Used   Substance and Sexual Activity   • Alcohol use: Yes     Frequency: 4 or more times a week     Drinks per session: 3 or 4     Comment: 1-2 beer daily   • Drug use: Yes     Types: Marijuana, Inhaled     Comment: marijuana   • Sexual activity: Yes   Lifestyle   • Physical activity     Days per week: Not on file     Minutes per session:  "Not on file   • Stress: Not on file   Relationships   • Social connections     Talks on phone: Not on file     Gets together: Not on file     Attends Taoist service: Not on file     Active member of club or organization: Not on file     Attends meetings of clubs or organizations: Not on file     Relationship status: Not on file   • Intimate partner violence     Fear of current or ex partner: Not on file     Emotionally abused: Not on file     Physically abused: Not on file     Forced sexual activity: Not on file   Other Topics Concern   • Not on file   Social History Narrative   • Not on file       SURGICAL HISTORY  History reviewed. No pertinent surgical history.    CURRENT MEDICATIONS  Home Medications    **Home medications have not yet been reviewed for this encounter**         ALLERGIES  No Known Allergies    PHYSICAL EXAM  VITAL SIGNS: /65   Pulse 99   Temp 36.1 °C (97 °F) (Temporal)   Resp 14   Ht 1.74 m (5' 8.5\")   Wt 86.2 kg (190 lb)   SpO2 100%   BMI 28.47 kg/m²   Constitutional: Well developed, Well nourished, No acute distress, Non-toxic appearance.   HENT: Normocephalic, atraumatic, oropharynx is moist, no significant tongue injury.  Eyes: Pupils are equal round reactive to light.  Extraocular movements are intact.  Neck: Normal range of motion, No tenderness, Supple, No stridor.   Cardiovascular: Normal heart rate, Normal rhythm, No murmurs, No rubs, No gallops.   Thorax & Lungs: Normal breath sounds, No respiratory distress, No wheezing, No chest tenderness.   Abdomen: Bowel sounds normal, Soft, No tenderness, No masses, No pulsatile masses.   Skin: Warm, Dry, No erythema, No rash.   Back: No tenderness, No CVA tenderness.   Extremities: Intact distal pulses, No edema, No tenderness, No cyanosis, No clubbing.   Neurologic: Cranial nerves II through XII are intact.  Normal speech and language.  Normal strength and sensation bilateral upper and lower extremities.  No focal deficits.  No " seizure activity.  Psychiatric: Affect normal, Judgment normal, Mood normal.     EKG      RADIOLOGY/PROCEDURES      COURSE & MEDICAL DECISION MAKING  Pertinent Labs & Imaging studies reviewed. (See chart for details)    The patient presents today after having had a seizure.  He has a known history of a seizure disorder.  Patient is given a dose of Ativan to prevent any seizure activity here.  I will have the pharmacy tech try to determine what medication this patient is on and at what dose.    Pharmacy tech was able to find that the patient has been on Depakote  mg twice daily.  Give the patient a dose here in the emergency department.    Patient is given a refill of his medication.  He is instructed to take this medication as prescribed.  I have submitted electronic referral to neurology.  Have the patient follow-up.  Discharged in stable condition.    The patient will return for new or worsening symptoms and is stable at the time of discharge.    The patient is referred to a primary physician for blood pressure management, diabetic screening, and for all other preventative health concerns.    DISPOSITION:  Patient will be discharged home in stable condition.    FOLLOW UP:  Trey Foster M.D.  [address] [facility] [id number] [phone]    Schedule an appointment as soon as possible for a visit       [facility], Emergency Dept  [address] [facility] [address] [phone]    If symptoms worsen      OUTPATIENT MEDICATIONS:  New Prescriptions    DIVALPROEX (DEPAKOTE) 500 MG TABLET DELAYED RESPONSE    Take 1 Tab by mouth 2 times a day.         FINAL IMPRESSION  1. Seizure (HCC)    2. Noncompliance with medications    3. Medication refill              Electronically signed by: Bony Ponce M.D., 11/24/2020 2:29 PM

## 2020-11-24 NOTE — ED NOTES
Dc instructions reviewed with pt. To f/u with pcp and neurology as well as  prescription at [facility].

## 2021-01-12 NOTE — PROGRESS NOTES
Chief Complaint   Patient presents with   • New Patient     seizure       Problem List Items Addressed This Visit     Obesity (BMI 30-39.9)    Relevant Orders    Patient identified as having weight management issue.  Appropriate orders and counseling given.      Other Visit Diagnoses     Localization-related epilepsy (HCC)        Relevant Medications    divalproex ER (DEPAKOTE ER) 250 MG TABLET SR 24 HR    divalproex ER (DEPAKOTE ER) 500 MG TABLET SR 24 HR    Other Relevant Orders    CBC WITH DIFFERENTIAL    Comp Metabolic Panel    VALPROIC ACID    AMMONIA    REFERRAL TO NEURODIAGNOSTICS (EEG,EP,EMG/NCS/DBS)    Screening for depression        Hospital discharge follow-up        Vitamin D deficiency        Relevant Orders    VITAMIN D,25 HYDROXY    History of alcohol abuse        Marijuana use              History of present illness:  Raymundo Vides 25 y.o. male presents today with mom for seizure evaluation.     Pt reports he started having seizure as a kid. He does not remember details but mom reports he started having night terrors (pt has abnormal behaviors at night) and was told he has night seizures. They were told that if they remove his tonsils and adenoids, it will go away. Mom admits that pt stopped having the night time spells afterwards. He was also started on medication for seizures and this was discontinued after his surgery. They report he has been spell free until 2019. Pt reports he started having reg vu's without convulsion happening daily. Then he started having convulsions where he states he was staring prior to his convulsion. He also has staring off spells without convulsion. He has no recollection of events. There was tongue biting and incontinence. Duration of spells ~2mins. He's been having frequent convulsions recently.  Last convulsion was on 1/13/2021. Trigger that they know of is alcohol and missing his dose. His last staring off spell was in Nov 2020. Mom reports he has tried keppra  and not sure why he was switched to depakote 500mg BID. No side effects. He denies missing doses and the only times he missed his doses were when he ran out of refills.     Mom also has seizure disorder and great grandma also had seizures. 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     Used to abuse alcohol. Quit since November. Smoking cigarettes and marijuana recreationally.     Mood fluctuates but stable. Denies depression, anxiety, irritability or agitation. No suicidal or homicidal thoughts.     He is not taking vit d    He states he is not driving.        Past medical history:   Past Medical History:   Diagnosis Date   • Seizure (HCC)        Past surgical history:   No past surgical history on file.    Family history:   Family History   Problem Relation Age of Onset   • Cancer Maternal Grandmother        Social history:   Social History     Socioeconomic History   • Marital status: Single     Spouse name: Not on file   • Number of children: Not on file   • Years of education: Not on file   • Highest education level: Not on file   Occupational History   • Not on file   Social Needs   • Financial resource strain: Not on file   • Food insecurity     Worry: Not on file     Inability: Not on file   • Transportation needs     Medical: Not on file     Non-medical: Not on file   Tobacco Use   • Smoking status: Current Some Day Smoker     Packs/day: 1.00     Years: 8.00     Pack years: 8.00     Types: Cigarettes     Last attempt to quit: 3/29/2019     Years since quittin.7   • Smokeless tobacco: Never Used   Substance and Sexual Activity   • Alcohol use: Yes     Frequency: 4 or more times a week     Drinks per session: 3 or 4     Comment: 1-2 beer daily   • Drug use: Yes     Types: Marijuana, Inhaled     Comment: marijuana   • Sexual activity: Yes   Lifestyle   • Physical activity     Days per week: Not on file      Minutes per session: Not on file   • Stress: Not on file   Relationships   • Social connections     Talks on phone: Not on file     Gets together: Not on file     Attends L[employer] service: Not on file     Active member of club or organization: Not on file     Attends meetings of clubs or organizations: Not on file     Relationship status: Not on file   • Intimate partner violence     Fear of current or ex partner: Not on file     Emotionally abused: Not on file     Physically abused: Not on file     Forced sexual activity: Not on file   Other Topics Concern   • Not on file   Social History Narrative   • Not on file       Current medications:   Current Outpatient Medications   Medication   • divalproex (DEPAKOTE) 500 MG Tablet Delayed Response     No current facility-administered medications for this visit.        Medication Allergy:  No Known Allergies      Review of systems:     General: Denies fevers or chills, or nightsweats, or generalized fatigue.    Head: Denies headaches or dizziness or lightheadedness  EENT: Denies vision changes, vision loss or pain, nasal secretion, nasal bleeding, difficulty swallowing, hearing loss, tinnitus, vertigo, ear pain  Respiratory: Denies shortness of breath, cough, sputum, or wheezing  Cardiac: Denies chest pain, palpitations, edema or syncope  Gastrointestinal: Denies nausea, vomiting, no abdominal pain or change in bowel habits, no melena or hematochezia  Urinary: Denies dysuria, frequency, hesitancy, or incontinence.  Dermatologic:  Denies new rash  Musculoskeletal: Denies muscle pain or swelling, no atrophy, no neck and back pain or stiffness.   Neurologic: Denies facial droopiness, muscle weakness (focal or generalized), paresthesias, ataxia, change in speech or language, memory loss, abnormal movements.+ seizures, loss of consciousness, or episodes of confusion.   Psychiatric: Denies anxiety, depression, mood swings, suicidal or homicidal thoughts       Physical  "examination:   Vitals:    01/15/21 0919   BP: 122/78   BP Location: Right arm   Patient Position: Sitting   BP Cuff Size: Adult   Pulse: 90   Resp: 16   Temp: 36.3 °C (97.3 °F)   TempSrc: Temporal   SpO2: 96%   Weight: 93 kg (205 lb 0.4 oz)   Height: 1.727 m (5' 8\")     General: Patient in no acute distress, pleasant and cooperative.  HEENT: Normocephalic, no signs of acute trauma.   moist conjunctivae. Nares are patent. Oropharynx clear without lesions and normal  hard and soft palates.   Neck: Supple. There is normal range of motion.   Resp: clear to auscultation bilaterally. No wheezes or crackles.   CV: RRR, no murmurs.   Skin: no signs of acute rashes or trauma.   Musculoskeletal: joints exhibit full range of motion. Wearing leg boot in left d/t fracture.  Psychiatric: No hallucinatory behavior. No symptoms of depression or suicidal ideation. Mood and affect appear normal on exam.     NEUROLOGICAL EXAM:   Mental status, orientation: Awake, alert and fully oriented.   Speech and language: speech is clear and fluent. The patient is able to name, repeat and comprehend.   Memory: There is intact recollection of recent and remote events.   Cranial nerve exam:   CN I: Not examined   CN II: PERRL.  CN III, IV, VI: EOMI; no nystagmus   CN V: Facial sensation intact bilaterally   CN VII: face symmetric   CN VIII: hearing intact to finger rub bilaterally   CN IX, X: palate elevates symmetrically   CN XI: Symmetric shoulder shrug  CN XII: tongue midline. No signs of tongue biting or fasciculations   Motor exam: Strength is 5/5 in all extremities. Tone is normal. No abnormal movements were seen on exam.   Sensory exam reveals normal sense of light touch, proprioception in all extremities.   Deep tendon reflexes:  2+ throughout. Unable to assess left leg d/t leg boot he is wearing. Plantar responses are flexor. There is no clonus.   Coordination: shows a normal finger-nose-finger. Normal rapidly alternating movements. "   Gait: The patient was able to get up from seated position on first attempt without requiring assistance. Found to be steady when walking. Movements were fluid with normal arm swing. The patient was able to turn without difficulties or tendency to fall. Romberg exam unremarkable.       ANCILLARY DATA REVIEWED:       Lab Data Review:  Reviewed in chart.     Records reviewed:   Reviewed in chart.    Imaging:   MRI brain w & w/o 9/10/2019  1. MRI of the brain without and with contrast within normal limits.     2. No evidence of mass lesion, heterotopic gray matter, gross cortical dysplasia or mesial temporal sclerosis.     3. Mucosal thickening in the bilateral maxillary sinuses.    EEG:  n/a        ASSESSMENT AND PLAN    1. Localization-related epilepsy (HCC)  - CBC WITH DIFFERENTIAL; Future  - Comp Metabolic Panel; Future  - VALPROIC ACID; Future  - AMMONIA; Future  - REFERRAL TO NEURODIAGNOSTICS (EEG,EP,EMG/NCS/DBS)  - divalproex ER (DEPAKOTE ER) 250 MG TABLET SR 24 HR; Take 1 Tab by mouth 2 Times a Day.  Dispense: 60 Tab; Refill: 11  - divalproex ER (DEPAKOTE ER) 500 MG TABLET SR 24 HR; Take 1 Tab by mouth 2 (two) times a day.  Dispense: 60 Tab; Refill: 11    2. Screening for depression    3. Hospital discharge follow-up    4. Vitamin D deficiency  - VITAMIN D,25 HYDROXY; Future    5. History of alcohol abuse    6. Marijuana use    7. Obesity (BMI 30-39.9)  - Patient identified as having weight management issue.  Appropriate orders and counseling given.    8. Encounter for smoking cessation counseling    9. Cigarette smoker      CLINICAL DISCUSSION:  Possible focal onset epilepsy. Unknown etiology. Hx of night terrors and was diagnosed with childhood epilepsy. Spell free until 2019. He has GTC and staring off spells. Spells were preceded by feeling of reg vus. He has hx of alcohol abuse and cocaine use. He stopped drinking alcohol in Nov 2020. Triggers that they know of is alcohol and missing dose. He was started  on keppra but switched to depakote because of flores issues. He continues to have spells despite being on depakote 500mg BID. He admits to missing dose d/t lack of refills. Last convulsion was on 1/13/2021. Last staring off was in Nov 2020. MRI brain was unremarkable.     Has family hx of epilepsy.     Mood is stable. No suicidal or homicidal thoughts.     Past ASM's: keppra (mood changes)    Current ASM's: depakote  BID, Depakote ER 250mg BID.      Plan:  - Routine EEG. Mayneed a longer study in the future.     - Discussed avoidance of spell/sz triggers: alcohol, sleep deprivation, energy drinks, benadryl and stress.    - Discussed Vit D supplementation. Recommended taking 2000-5000u daily.    - Discussed driving restrictions. Recommended no driving for at least 3 months given frequent spells. Will submit form to the DMV.     -Discussed lifestyle modification including exercise and weight loss.     -Labs to be checked for next appointment: CBC, CMP, Vit D, VPA, Ammonia            FOLLOW-UP:   Return in about 4 weeks (around 2/12/2021).      EDUCATION AND COUNSELING:  -Education was provided to the patient and/or family regarding diagnosis and prognosis. The chronic and unpredictable nature of the condition were discussed. There is increased risk for additional events, which may carry potential for significant injuries and death. Discussed frequent seizure triggers: sleep deprivation, medication non-compliance, use of illegal drugs/alcohol, stress, and others.   -We reviewed in detail the current antiepileptic regimen. Potential side effects of antiepileptics were discussed at length, including but no limited to: hypersensitivity reactions (rash and others, some of which can be fatal), visual field changes (some of which may be irreversible), glaucoma, diplopia, kidney stones, osteopenia/osteoporosis/bone fractures, hyperthermia/anhydrosis, hyponatremia, tremors/abnormal movements, ataxia, dizziness, fatigue,  increased risk for falls, risk for cardiac arrhythmias/syncope, gastrointestinal side effects(hepatitis, pancreatitis, gastritis, ulcers), gingival hypertrophy/bleeding, drowsiness, sedation, anxiety/nervousness, increased risk for suicide, increased risk for depression, and psychosis.   -We also reviewed drug-drug interactions and their potential effect on seizure control and medication side effects.    -Recommend chronic vitamin D supplementation and regular exercise (if not contraindicated).   -Patient/family educated on risk for SUDEP (Sudden Death in Epilepsy). Counseling was provided on the importance of strict medication and follow up compliance. The patient/family understand the risks associated with non-adherence with the medical plan as outlined, including but not limited to an increased risk for breakthrough seizures, which may contribute to injuries, disability, status epilepticus, and even death.   -Counseling was also provided on potential effects of alcohol and other drugs, which may lower seizure threshold and/or affect the metabolism of antiepileptic drugs. We recommend avoidance of alcohol and illegal drugs.  -Avoid sleep deprivation.   -We extensively discussed the aspects related to safety in drivers who suffer from epilepsy. The patient is encourage to report to the Division of Motor Vehicles of any condition and/or spells related to confusion, disorientation, and/or loss of awareness and/or loss of consciousness; as these may pose a safety issue if they occur while operating a motor vehicle. The patient and/or family are ultimately responsible for exercising caution and abiding to regulations in place.   -Other seizure precautions were discussed at length, including no diving, no skydiving, no climbing or exposure to unprotected heights, no unsupervised swimming, no Jacuzzi or bathing in bathtubs or deep bodies of water. The patient/family have been advised about risks for operating any  machinery while suffering from seizures / syncope / epilepsy and/or while taking antiepileptic drugs.   -The patient understands and agrees that due to the complexity of his/her diagnosis, results of any testing and further recommendations will typically be discussed/made during a face to face encounter in my office. The patient and/or family further understands it is their responsibility to keep proper follow up.     Patient/family agree with plan, as outlined.         Birdie Owens, MSN, APRN, FNP-C  [facility]  Office: [phone]  Fax: [phone]

## 2021-01-15 ENCOUNTER — OFFICE VISIT (OUTPATIENT)
Dept: NEUROLOGY | Facility: MEDICAL CENTER | Age: 26
End: 2021-01-15
Attending: NURSE PRACTITIONER
Payer: COMMERCIAL

## 2021-01-15 VITALS
OXYGEN SATURATION: 96 % | HEIGHT: 68 IN | HEART RATE: 90 BPM | TEMPERATURE: 97.3 F | WEIGHT: 205.03 LBS | BODY MASS INDEX: 31.07 KG/M2 | RESPIRATION RATE: 16 BRPM | SYSTOLIC BLOOD PRESSURE: 122 MMHG | DIASTOLIC BLOOD PRESSURE: 78 MMHG

## 2021-01-15 DIAGNOSIS — E66.9 OBESITY (BMI 30-39.9): ICD-10-CM

## 2021-01-15 DIAGNOSIS — Z71.6 ENCOUNTER FOR SMOKING CESSATION COUNSELING: ICD-10-CM

## 2021-01-15 DIAGNOSIS — F10.11 HISTORY OF ALCOHOL ABUSE: ICD-10-CM

## 2021-01-15 DIAGNOSIS — G40.109 LOCALIZATION-RELATED EPILEPSY (HCC): ICD-10-CM

## 2021-01-15 DIAGNOSIS — E55.9 VITAMIN D DEFICIENCY: ICD-10-CM

## 2021-01-15 DIAGNOSIS — F12.90 MARIJUANA USE: ICD-10-CM

## 2021-01-15 DIAGNOSIS — Z13.31 SCREENING FOR DEPRESSION: ICD-10-CM

## 2021-01-15 DIAGNOSIS — F17.210 CIGARETTE SMOKER: ICD-10-CM

## 2021-01-15 DIAGNOSIS — Z09 HOSPITAL DISCHARGE FOLLOW-UP: ICD-10-CM

## 2021-01-15 PROCEDURE — 99204 OFFICE O/P NEW MOD 45 MIN: CPT | Performed by: NURSE PRACTITIONER

## 2021-01-15 RX ORDER — DIVALPROEX SODIUM 500 MG/1
500 TABLET, EXTENDED RELEASE ORAL
Qty: 60 TAB | Refills: 11 | Status: SHIPPED | OUTPATIENT
Start: 2021-01-15 | End: 2021-10-11

## 2021-01-15 RX ORDER — DIVALPROEX SODIUM 250 MG/1
250 TABLET, EXTENDED RELEASE ORAL 2 TIMES DAILY
Qty: 60 TAB | Refills: 11 | Status: SHIPPED | OUTPATIENT
Start: 2021-01-15 | End: 2021-10-11

## 2021-01-15 RX ORDER — DIVALPROEX SODIUM 500 MG/1
500 TABLET, DELAYED RELEASE ORAL 2 TIMES DAILY
Qty: 60 TAB | Refills: 5 | Status: CANCELLED | OUTPATIENT
Start: 2021-01-15

## 2021-01-15 ASSESSMENT — PATIENT HEALTH QUESTIONNAIRE - PHQ9: CLINICAL INTERPRETATION OF PHQ2 SCORE: 0

## 2021-02-01 ENCOUNTER — NON-PROVIDER VISIT (OUTPATIENT)
Dept: NEUROLOGY | Facility: MEDICAL CENTER | Age: 26
End: 2021-02-01
Attending: NURSE PRACTITIONER
Payer: COMMERCIAL

## 2021-02-01 DIAGNOSIS — G40.109 LOCALIZATION-RELATED EPILEPSY (HCC): ICD-10-CM

## 2021-02-01 PROCEDURE — 95816 EEG AWAKE AND DROWSY: CPT | Performed by: STUDENT IN AN ORGANIZED HEALTH CARE EDUCATION/TRAINING PROGRAM

## 2021-02-01 PROCEDURE — 95816 EEG AWAKE AND DROWSY: CPT | Mod: 26 | Performed by: STUDENT IN AN ORGANIZED HEALTH CARE EDUCATION/TRAINING PROGRAM

## 2021-02-01 NOTE — PROCEDURES
VIDEO ELECTROENCEPHALOGRAM REPORT      Referring provider:   ALLYSSA Kirk  [address] [place] [address]      DOS: 02/01/21 (0 hours and 25 minutes of total recording time).     INDICATION:  Raymundo Vides 25 y.o. male presenting with seizures    CURRENT ANTIEPILEPTIC AND/OR SEDATING REGIMEN: Depakote    TECHNIQUE: 30 channel video electroencephalogram (EEG) was performed in accordance with the international 10-20 system. The study was reviewed in bipolar and referential montages. The recording examined the patient in the awake and drowsy state(s).     DESCRIPTION OF THE RECORD:  During wakefulness, the background was continuous and showed a 10 Hz posterior dominant rhythm.  There was reactivity to eye closure/opening.  A normal anterior-posterior gradient was noted with faster beta frequencies seen anteriorly.  During drowsiness, theta/delta frequencies were seen.    Sleep was captured and was characterized by diffuse background delta/theta activity with a loss of myogenic artifact.  N2 sleep transients in the form of sleep spindles and vertex waves were seen in the leads over the central regions.     ACTIVATION PROCEDURES:   Hyperventilation was performed by the patient for a total of 3 minutes. The technician performing the test noted good effort. This technique produced electroencephalographic changes in keeping with the expected bilaterally synchronous, frontally predominant, high amplitude slow waves build up.      Intermittent Photic stimulation was performed in a stepwise fashion from 1 to 30 Hz, and did not elicit any abnormal responses.      ICTAL AND INTERICTAL FINDINGS:   No focal or generalized epileptiform activity noted.     No regional slowing was seen during this routine study.      No clinical events or seizures were reported or recorded during the study.     EKG: sampling of the EKG recording did not demonstrate any abnormalities    EVENTS:   None    INTERPRETATION:  Normal video EEG recording in the awake and drowsy state(s):  - No persistent focal asymmetries seen.  - No definitive epileptiform discharges seen   - No seizures. Clinical correlation is recommended.      Note: This EEG does not rule out epilepsy.  If the clinical suspicion remains high for seizures, a prolonged recording to capture clinical or subclinical events may be helpful.        Yobani Modi MD  Epilepsy and General Neurology  Department of Neurology  Instructor of Clinical Neurology [facility].   Office: [phone]  Fax: [phone]

## 2021-04-09 NOTE — PROGRESS NOTES
Chief Complaint   Patient presents with   • Follow-Up     Localization-related epilepsy        Problem List Items Addressed This Visit     None      Visit Diagnoses     Localization-related epilepsy (HCC)        Relevant Orders    REFERRAL TO NEURODIAGNOSTICS (EEG,EP,EMG/NCS/DBS)    Screening for depression        Encounter for smoking cessation counseling        History of alcohol abuse              Interim History:  Raymundo Vides 25 y.o. male presents today for f/u.     He reports of no seizures on current depakote dose. No side effects. He had missed only one dose since last visit. Mood is good. No SI or HI. Pt is interested in driving again as he has a new job. He states he quit alcohol, cigarettes and marijuana use. He had his EEG done but forgot the lab work. No other issues.       Past medical history:   Past Medical History:   Diagnosis Date   • Seizure (HCC)        Past surgical history:   No past surgical history on file.    Family history:   Family History   Problem Relation Age of Onset   • Cancer Maternal Grandmother        Social history:   Social History     Socioeconomic History   • Marital status: Single     Spouse name: Not on file   • Number of children: Not on file   • Years of education: Not on file   • Highest education level: Not on file   Occupational History   • Not on file   Tobacco Use   • Smoking status: Current Some Day Smoker     Packs/day: 1.00     Years: 8.00     Pack years: 8.00     Types: Cigarettes     Last attempt to quit: 3/29/2019     Years since quittin.0   • Smokeless tobacco: Never Used   Substance and Sexual Activity   • Alcohol use: Yes     Comment: 1-2 beer daily   • Drug use: Yes     Types: Marijuana, Inhaled     Comment: marijuana   • Sexual activity: Yes   Other Topics Concern   • Not on file   Social History Narrative   • Not on file     Social Determinants of Health     Financial Resource Strain:    • Difficulty of Paying Living Expenses:    Food Insecurity:     • Worried About Running Out of Food in the Last Year:    • Ran Out of Food in the Last Year:    Transportation Needs:    • Lack of Transportation (Medical):    • Lack of Transportation (Non-Medical):    Physical Activity:    • Days of Exercise per Week:    • Minutes of Exercise per Session:    Stress:    • Feeling of Stress :    Social Connections:    • Frequency of Communication with Friends and Family:    • Frequency of Social Gatherings with Friends and Family:    • Attends [employer] Services:    • Active Member of Clubs or Organizations:    • Attends Club or Organization Meetings:    • Marital Status:    Intimate Partner Violence:    • Fear of Current or Ex-Partner:    • Emotionally Abused:    • Physically Abused:    • Sexually Abused:        Current medications:   Current Outpatient Medications   Medication   • divalproex ER (DEPAKOTE ER) 250 MG TABLET SR 24 HR   • divalproex ER (DEPAKOTE ER) 500 MG TABLET SR 24 HR     No current facility-administered medications for this visit.       Medication Allergy:  No Known Allergies      Review of systems:     General: Denies fevers or chills, or nightsweats, or generalized fatigue.    Head: Denies headaches or dizziness or lightheadedness  EENT: Denies vision changes, vision loss or pain, nasal secretion, nasal bleeding, difficulty swallowing, hearing loss, tinnitus, vertigo, ear pain  Musculoskeletal: Denies muscle pain or swelling, no atrophy, no neck and back pain or stiffness.   Neurologic: Denies facial droopiness, muscle weakness (focal or generalized), paresthesias, ataxia, change in speech or language, memory loss, abnormal movements, seizures, loss of consciousness, or episodes of confusion.   Psychiatric: Denies anxiety, depression, mood swings, suicidal or homicidal thoughts       Physical examination:   Vitals:    04/16/21 0715   BP: 118/78   BP Location: Right arm   Patient Position: Sitting   BP Cuff Size: Adult   Pulse: 86   Resp: 14   Temp: 36.2 °C  (97.1 °F)   TempSrc: Temporal   SpO2: 96%   Weight: 104 kg (229 lb)     General: Patient in no acute distress, pleasant and cooperative.  HEENT: Normocephalic, no signs of acute trauma.   Neck: Supple. There is normal range of motion.   Musculoskeletal: joints exhibit full range of motion  Psychiatric: No hallucinatory behavior. No symptoms of depression or suicidal ideation. Mood and affect appear normal on exam.     NEUROLOGICAL EXAM:   Mental status, orientation: Awake, alert and fully oriented.   Speech and language: speech is clear and fluent. The patient is able to name, repeat and comprehend.   Memory: There is intact recollection of recent and remote events.   Motor exam: Strength is 5/5 in all extremities. Tone is normal. No abnormal movements were seen on exam.   Sensory exam reveals normal sense of light touch in all extremities.       ANCILLARY DATA REVIEWED:       Lab Data Review:  Reviewed in chart.    Records reviewed:   Reviewed in chart.    Imaging:   MRI brain w & w/o 9/10/2019  1. MRI of the brain without and with contrast within normal limits.     2. No evidence of mass lesion, heterotopic gray matter, gross cortical dysplasia or mesial temporal sclerosis.     3. Mucosal thickening in the bilateral maxillary sinuses.     EEG:  Routine EEG 2/1/21  Normal video EEG recording in the awake and drowsy state(s):   - No persistent focal asymmetries seen.   - No definitive epileptiform discharges seen   - No seizures. Clinical correlation is recommended.           ASSESSMENT AND PLAN:  1. Localization-related epilepsy (HCC)  - REFERRAL TO NEURODIAGNOSTICS (EEG,EP,EMG/NCS/DBS)    2. Screening for depression    3. Encounter for smoking cessation counseling    4. History of alcohol abuse          CLINICAL DISCUSSION:  Possible focal onset epilepsy. Unknown etiology. Hx of night terrors and was diagnosed with childhood epilepsy. Spell free until 2019. He has GTC and staring off spells. Spells were preceded  by feeling of reg vus. He has hx of alcohol abuse and cocaine use. He stopped drinking alcohol in Nov 2020. Triggers that they know of is alcohol and missing dose. He was started on keppra but switched to depakote because of mood issues. He remains spell free on the higher depakote dose and he has been taking his medications regularly. Last convulsion was on 1/13/2021. Last staring spell was in Nov 2020. His routine EEG was normal but aware that normal EEG does not r/o epilepsy.      Has family hx of epilepsy.      Mood is stable. No suicidal or homicidal thoughts.      Past ASM's: keppra (mood changes)     Current ASM's: depakote  BID, Depakote ER 250mg BID.        Plan:  - 24hr EEG     - Discussed avoidance of spell/sz triggers: alcohol, sleep deprivation, energy drinks, benadryl and stress.     - Discussed Vit D supplementation. Recommended taking 2000-5000u daily.     - Discussed driving restrictions. Okay to resume driving but aware to stop if he has another spell. DMV paperwork faxed and copy given to pt.      -Labs to be checked for next appointment: CBC, CMP, Vit D, VPA, Ammonia- pt for got to get this done        FOLLOW-UP:   Return in about 4 weeks (around 5/14/2021).      EDUCATION AND COUNSELING:  -Education was provided to the patient and/or family regarding diagnosis and prognosis. The chronic and unpredictable nature of the condition were discussed. There is increased risk for additional events, which may carry potential for significant injuries and death. Discussed frequent seizure triggers: sleep deprivation, medication non-compliance, use of illegal drugs/alcohol, stress, and others.   -We reviewed in detail the current antiepileptic regimen. Potential side effects of antiepileptics were discussed at length, including but no limited to: hypersensitivity reactions (rash and others, some of which can be fatal), visual field changes (some of which may be irreversible), glaucoma, diplopia,  kidney stones, osteopenia/osteoporosis/bone fractures, hyperthermia/anhydrosis, hyponatremia, tremors/abnormal movements, ataxia, dizziness, fatigue, increased risk for falls, risk for cardiac arrhythmias/syncope, gastrointestinal side effects(hepatitis, pancreatitis, gastritis, ulcers), gingival hypertrophy/bleeding, drowsiness, sedation, anxiety/nervousness, increased risk for suicide, increased risk for depression, and psychosis.   -We also reviewed drug-drug interactions and their potential effect on seizure control and medication side effects.    -Recommend chronic vitamin D supplementation and regular exercise (if not contraindicated).   -Patient/family educated on risk for SUDEP (Sudden Death in Epilepsy). Counseling was provided on the importance of strict medication and follow up compliance. The patient/family understand the risks associated with non-adherence with the medical plan as outlined, including but not limited to an increased risk for breakthrough seizures, which may contribute to injuries, disability, status epilepticus, and even death.   -Counseling was also provided on potential effects of alcohol and other drugs, which may lower seizure threshold and/or affect the metabolism of antiepileptic drugs. We recommend avoidance of alcohol and illegal drugs.  -Avoid sleep deprivation.   -We extensively discussed the aspects related to safety in drivers who suffer from epilepsy. The patient is encourage to report to the Division of Motor Vehicles of any condition and/or spells related to confusion, disorientation, and/or loss of awareness and/or loss of consciousness; as these may pose a safety issue if they occur while operating a motor vehicle. The patient and/or family are ultimately responsible for exercising caution and abiding to regulations in place.   -Other seizure precautions were discussed at length, including no diving, no skydiving, no climbing or exposure to unprotected heights, no  unsupervised swimming, no Jacuzzi or bathing in bathtubs or deep bodies of water. The patient/family have been advised about risks for operating any machinery while suffering from seizures / syncope / epilepsy and/or while taking antiepileptic drugs.   -The patient understands and agrees that due to the complexity of his/her diagnosis, results of any testing and further recommendations will typically be discussed/made during a face to face encounter in my office. The patient and/or family further understands it is their responsibility to keep proper follow up.     Patient agrees with plan, as outlined.         Birdie Owens, MSN, APRN, FNP-C  [facility]  Office: [phone]  Fax: [phone]    BILLING DOCUMENTATION:     Total time spent including chart review before and after visit was 25min. Over 50% of the time of the visit today was spent on counseling and or coordination of care wtih the patient and/or family, with greater than 50% of the total discussing my assessment and plan as stated above.

## 2021-04-16 ENCOUNTER — OFFICE VISIT (OUTPATIENT)
Dept: NEUROLOGY | Facility: MEDICAL CENTER | Age: 26
End: 2021-04-16
Attending: NURSE PRACTITIONER
Payer: COMMERCIAL

## 2021-04-16 VITALS
OXYGEN SATURATION: 96 % | DIASTOLIC BLOOD PRESSURE: 78 MMHG | BODY MASS INDEX: 34.82 KG/M2 | SYSTOLIC BLOOD PRESSURE: 118 MMHG | TEMPERATURE: 97.1 F | RESPIRATION RATE: 14 BRPM | WEIGHT: 229 LBS | HEART RATE: 86 BPM

## 2021-04-16 DIAGNOSIS — Z13.31 SCREENING FOR DEPRESSION: ICD-10-CM

## 2021-04-16 DIAGNOSIS — Z71.6 ENCOUNTER FOR SMOKING CESSATION COUNSELING: ICD-10-CM

## 2021-04-16 DIAGNOSIS — F10.11 HISTORY OF ALCOHOL ABUSE: ICD-10-CM

## 2021-04-16 DIAGNOSIS — G40.109 LOCALIZATION-RELATED EPILEPSY (HCC): ICD-10-CM

## 2021-04-16 PROBLEM — G40.909 SEIZURE DISORDER (HCC): Status: ACTIVE | Noted: 2019-07-29

## 2021-04-16 PROBLEM — S82.63XA CLOSED FRACTURE OF LATERAL MALLEOLUS: Status: ACTIVE | Noted: 2020-10-13

## 2021-04-16 PROBLEM — S63.064A: Status: ACTIVE | Noted: 2017-01-18

## 2021-04-16 PROCEDURE — 99213 OFFICE O/P EST LOW 20 MIN: CPT | Performed by: NURSE PRACTITIONER

## 2021-04-16 PROCEDURE — 99212 OFFICE O/P EST SF 10 MIN: CPT | Performed by: NURSE PRACTITIONER

## 2021-05-12 NOTE — PROGRESS NOTES
Chief Complaint   Patient presents with   • Follow-Up     Localization-related epilepsy        Problem List Items Addressed This Visit     None      Visit Diagnoses     Localization-related epilepsy (HCC)        Screening for depression        History of alcohol abuse              Interim History:  Raymundo Vides 25 y.o. male presents today for f/u    He continues to have no seizures. Compliant with depakote 750mg BID. No side effects. Denies any tremors. Mood is good. No SI or HI. He was taking vit D but he ran out. He is not using substances anymore. He is driving with no issues. 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 He for got to do his blood work and his EEG is pending.      Past medical history:   Past Medical History:   Diagnosis Date   • Seizure (HCC)        Past surgical history:   No past surgical history on file.    Family history:   Family History   Problem Relation Age of Onset   • Cancer Maternal Grandmother        Social history:   Social History     Socioeconomic History   • Marital status: Single     Spouse name: Not on file   • Number of children: Not on file   • Years of education: Not on file   • Highest education level: Not on file   Occupational History   • Not on file   Tobacco Use   • Smoking status: Current Some Day Smoker     Packs/day: 1.00     Years: 8.00     Pack years: 8.00     Types: Cigarettes     Last attempt to quit: 3/29/2019     Years since quittin.1   • Smokeless tobacco: Never Used   Vaping Use   • Vaping Use: Never used   Substance and Sexual Activity   • Alcohol use: Yes     Comment: 1-2 beer daily   • Drug use: Yes     Types: Marijuana, Inhaled     Comment: marijuana   • Sexual activity: Yes   Other Topics Concern   • Not on file   Social History Narrative   • Not on file     Social Determinants of Health     Financial Resource Strain:    • Difficulty of Paying Living Expenses:    Food Insecurity:    • Worried About Running Out  "of Food in the Last Year:    • Ran Out of Food in the Last Year:    Transportation Needs:    • Lack of Transportation (Medical):    • Lack of Transportation (Non-Medical):    Physical Activity:    • Days of Exercise per Week:    • Minutes of Exercise per Session:    Stress:    • Feeling of Stress :    Social Connections:    • Frequency of Communication with Friends and Family:    • Frequency of Social Gatherings with Friends and Family:    • Attends Buddhism Services:    • Active Member of Clubs or Organizations:    • Attends Club or Organization Meetings:    • Marital Status:    Intimate Partner Violence:    • Fear of Current or Ex-Partner:    • Emotionally Abused:    • Physically Abused:    • Sexually Abused:        Current medications:   Current Outpatient Medications   Medication   • divalproex ER (DEPAKOTE ER) 250 MG TABLET SR 24 HR   • divalproex ER (DEPAKOTE ER) 500 MG TABLET SR 24 HR     No current facility-administered medications for this visit.       Medication Allergy:  No Known Allergies      Review of systems:     General: Denies fevers or chills, or nightsweats, or generalized fatigue.    Head: Denies headaches or dizziness or lightheadedness  Musculoskeletal: Denies muscle pain or swelling, no atrophy, no neck and back pain or stiffness.   Neurologic: Denies facial droopiness, muscle weakness (focal or generalized), paresthesias, ataxia, change in speech or language, memory loss, abnormal movements, seizures, loss of consciousness, or episodes of confusion.   Psychiatric: Denies anxiety, depression, mood swings, suicidal or homicidal thoughts       Physical examination:   Vitals:    05/17/21 0719   BP: 122/68   BP Location: Right arm   Patient Position: Standing   BP Cuff Size: Adult   Pulse: 84   Resp: 14   Temp: 36.1 °C (97 °F)   TempSrc: Temporal   SpO2: 98%   Weight: 103 kg (227 lb 1.2 oz)   Height: 1.715 m (5' 7.5\")     General: Patient in no acute distress, pleasant and cooperative.  HEENT: " Normocephalic, no signs of acute trauma.   Neck: Supple. There is normal range of motion.   Musculoskeletal: joints exhibit full range of motion  Psychiatric: No hallucinatory behavior. No symptoms of depression or suicidal ideation. Mood and affect appear normal on exam.     NEUROLOGICAL EXAM:   Mental status, orientation: Awake, alert and fully oriented.   Speech and language: speech is clear and fluent. The patient is able to name, repeat and comprehend.   Memory: There is intact recollection of recent and remote events.   Motor exam: Strength is 5/5 in all extremities. Tone is normal. No abnormal movements were seen on exam.   Sensory exam reveals normal sense of light touch in all extremities.   Gait: The patient was able to get up from seated position on first attempt without requiring assistance. Found to be steady when walking. Movements were fluid with normal arm swing.    ANCILLARY DATA REVIEWED:       Lab Data Review:  Reviewed in chart.     Records reviewed:   Reviewed in chart.    Imaging:   MRI brain w & w/o 9/10/2019  1. MRI of the brain without and with contrast within normal limits.     2. No evidence of mass lesion, heterotopic gray matter, gross cortical dysplasia or mesial temporal sclerosis.     3. Mucosal thickening in the bilateral maxillary sinuses.     EEG:  Routine EEG 2/1/21  Normal video EEG recording in the awake and drowsy state(s):   - No persistent focal asymmetries seen.   - No definitive epileptiform discharges seen   - No seizures. Clinical correlation is recommended.        ASSESSMENT AND PLAN:    1. Localization-related epilepsy (HCC)    2. Screening for depression    3. History of alcohol abuse          CLINICAL DISCUSSION:  Possible focal onset epilepsy. Unknown etiology. Hx of night terrors and was diagnosed with childhood epilepsy. Spell free until 2019. He has GTC and staring off spells. Spells were preceded by feeling of reg vus. He has hx of alcohol abuse and cocaine use.  He stopped drinking alcohol in Nov 2020. Triggers that they know of is alcohol and missing dose. He was started on keppra but switched to depakote because of mood issues. He remains spell free on the higher depakote dose and he has been taking his medications regularly. Last convulsion was on 1/13/2021. Last staring spell was in Nov 2020. His routine EEG was normal but aware that normal EEG does not r/o epilepsy. He remains spell free.      Has family hx of epilepsy.      Mood is stable. No suicidal or homicidal thoughts.      Past ASM's: keppra (mood changes)     Current ASM's: depakote  BID, Depakote ER 250mg BID.        Plan:  - 24hr EEG- pending     - Discussed avoidance of spell/sz triggers: alcohol, sleep deprivation, energy drinks, benadryl and stress.     - Discussed Vit D supplementation. Recommended taking 2000-5000u daily.     - Discussed driving restrictions. Okay to continue driving but aware to stop if he has another spell or seizure with LOC or impaired awareness.      -Labs to be checked for next appointment: CBC, CMP, Vit D, VPA, Ammonia- pt forgot this        FOLLOW-UP:   Return in about 2 months (around 7/17/2021).      EDUCATION AND COUNSELING:  -Education was provided to the patient and/or family regarding diagnosis and prognosis. The chronic and unpredictable nature of the condition were discussed. There is increased risk for additional events, which may carry potential for significant injuries and death. Discussed frequent seizure triggers: sleep deprivation, medication non-compliance, use of illegal drugs/alcohol, stress, and others.   -We reviewed in detail the current antiepileptic regimen. Potential side effects of antiepileptics were discussed at length, including but no limited to: hypersensitivity reactions (rash and others, some of which can be fatal), visual field changes (some of which may be irreversible), glaucoma, diplopia, kidney stones, osteopenia/osteoporosis/bone  fractures, hyperthermia/anhydrosis, hyponatremia, tremors/abnormal movements, ataxia, dizziness, fatigue, increased risk for falls, risk for cardiac arrhythmias/syncope, gastrointestinal side effects(hepatitis, pancreatitis, gastritis, ulcers), gingival hypertrophy/bleeding, drowsiness, sedation, anxiety/nervousness, increased risk for suicide, increased risk for depression, and psychosis.   -We also reviewed drug-drug interactions and their potential effect on seizure control and medication side effects.    -Recommend chronic vitamin D supplementation and regular exercise (if not contraindicated).   -Patient/family educated on risk for SUDEP (Sudden Death in Epilepsy). Counseling was provided on the importance of strict medication and follow up compliance. The patient/family understand the risks associated with non-adherence with the medical plan as outlined, including but not limited to an increased risk for breakthrough seizures, which may contribute to injuries, disability, status epilepticus, and even death.   -Counseling was also provided on potential effects of alcohol and other drugs, which may lower seizure threshold and/or affect the metabolism of antiepileptic drugs. We recommend avoidance of alcohol and illegal drugs.  -Avoid sleep deprivation.   -We extensively discussed the aspects related to safety in drivers who suffer from epilepsy. The patient is encourage to report to the Division of Motor Vehicles of any condition and/or spells related to confusion, disorientation, and/or loss of awareness and/or loss of consciousness; as these may pose a safety issue if they occur while operating a motor vehicle. The patient and/or family are ultimately responsible for exercising caution and abiding to regulations in place.   -Other seizure precautions were discussed at length, including no diving, no skydiving, no climbing or exposure to unprotected heights, no unsupervised swimming, no Jacuzzi or bathing in  bathtubs or deep bodies of water. The patient/family have been advised about risks for operating any machinery while suffering from seizures / syncope / epilepsy and/or while taking antiepileptic drugs.   -The patient understands and agrees that due to the complexity of his/her diagnosis, results of any testing and further recommendations will typically be discussed/made during a face to face encounter in my office. The patient and/or family further understands it is their responsibility to keep proper follow up.     Patient agrees with plan, as outlined.         Birdie Owens, MSN, APRN, FNP-C  [facility]  Office: [phone]  Fax: [phone]    BILLING DOCUMENTATION:   Total time spent including chart review before and after visit was 15min. Over 50% of the time of the visit today was spent on counseling and or coordination of care wtih the patient and/or family, with greater than 50% of the total discussing my assessment and plan as stated above.

## 2021-05-17 ENCOUNTER — OFFICE VISIT (OUTPATIENT)
Dept: NEUROLOGY | Facility: MEDICAL CENTER | Age: 26
End: 2021-05-17
Attending: NURSE PRACTITIONER
Payer: COMMERCIAL

## 2021-05-17 VITALS
RESPIRATION RATE: 14 BRPM | DIASTOLIC BLOOD PRESSURE: 68 MMHG | SYSTOLIC BLOOD PRESSURE: 122 MMHG | OXYGEN SATURATION: 98 % | HEART RATE: 84 BPM | WEIGHT: 227.07 LBS | TEMPERATURE: 97 F | HEIGHT: 68 IN | BODY MASS INDEX: 34.41 KG/M2

## 2021-05-17 DIAGNOSIS — G40.109 LOCALIZATION-RELATED EPILEPSY (HCC): ICD-10-CM

## 2021-05-17 DIAGNOSIS — F10.11 HISTORY OF ALCOHOL ABUSE: ICD-10-CM

## 2021-05-17 DIAGNOSIS — Z13.31 SCREENING FOR DEPRESSION: ICD-10-CM

## 2021-05-17 PROCEDURE — 99213 OFFICE O/P EST LOW 20 MIN: CPT | Performed by: NURSE PRACTITIONER

## 2021-05-17 PROCEDURE — 99212 OFFICE O/P EST SF 10 MIN: CPT | Performed by: NURSE PRACTITIONER

## 2022-11-14 ENCOUNTER — OFFICE VISIT (OUTPATIENT)
Dept: INTERNAL MEDICINE | Facility: OTHER | Age: 27
End: 2022-11-14
Payer: COMMERCIAL

## 2022-11-14 VITALS
WEIGHT: 225 LBS | SYSTOLIC BLOOD PRESSURE: 107 MMHG | TEMPERATURE: 98 F | DIASTOLIC BLOOD PRESSURE: 65 MMHG | BODY MASS INDEX: 34.1 KG/M2 | OXYGEN SATURATION: 96 % | HEART RATE: 87 BPM | HEIGHT: 68 IN

## 2022-11-14 DIAGNOSIS — Z13.228 SCREENING FOR METABOLIC DISORDER: ICD-10-CM

## 2022-11-14 DIAGNOSIS — E66.9 CLASS 1 OBESITY WITH BODY MASS INDEX (BMI) OF 34.0 TO 34.9 IN ADULT, UNSPECIFIED OBESITY TYPE, UNSPECIFIED WHETHER SERIOUS COMORBIDITY PRESENT: ICD-10-CM

## 2022-11-14 DIAGNOSIS — F12.90 MARIJUANA USE, CONTINUOUS: ICD-10-CM

## 2022-11-14 DIAGNOSIS — G40.909 SEIZURE DISORDER (HCC): ICD-10-CM

## 2022-11-14 PROBLEM — S82.63XA CLOSED FRACTURE OF LATERAL MALLEOLUS: Status: RESOLVED | Noted: 2020-10-13 | Resolved: 2022-11-14

## 2022-11-14 PROBLEM — S63.064A: Status: RESOLVED | Noted: 2017-01-18 | Resolved: 2022-11-14

## 2022-11-14 PROBLEM — E66.811 CLASS 1 OBESITY IN ADULT: Status: ACTIVE | Noted: 2021-01-15

## 2022-11-14 PROCEDURE — 99204 OFFICE O/P NEW MOD 45 MIN: CPT | Mod: GC

## 2022-11-14 RX ORDER — DIVALPROEX SODIUM 250 MG/1
750 TABLET, DELAYED RELEASE ORAL
COMMUNITY
Start: 2022-06-11 | End: 2022-11-14

## 2022-11-14 ASSESSMENT — ENCOUNTER SYMPTOMS
WEAKNESS: 0
BLOOD IN STOOL: 0
WEIGHT LOSS: 0
HEADACHES: 0
WHEEZING: 0
HEARTBURN: 0
CLAUDICATION: 0
SENSORY CHANGE: 0
DIZZINESS: 0
DIARRHEA: 0
ABDOMINAL PAIN: 0
FEVER: 0
ORTHOPNEA: 0
SHORTNESS OF BREATH: 0
CHILLS: 0
BLURRED VISION: 0
VOMITING: 0
NECK PAIN: 0
COUGH: 0
SPUTUM PRODUCTION: 0
BACK PAIN: 0
CONSTIPATION: 0
INSOMNIA: 0
DEPRESSION: 0
NERVOUS/ANXIOUS: 0
DOUBLE VISION: 0
SORE THROAT: 0
EYE DISCHARGE: 0
PALPITATIONS: 0
BRUISES/BLEEDS EASILY: 0
NAUSEA: 0

## 2022-11-14 NOTE — PROGRESS NOTES
Subjective:     CC:  Diagnoses of Marijuana use, continuous, Seizure disorder (HCC), Class 1 obesity with body mass index (BMI) of 34.0 to 34.9 in adult, unspecified obesity type, unspecified whether serious comorbidity present, and Screening for metabolic disorder were pertinent to this visit.    HISTORY OF THE PRESENT ILLNESS: Patient is a 26 y.o. male. This pleasant patient is here today to establish care.  Patient is a past medical history of seizure disorder, continuous marijuana use, alcohol use disorder, GERD, tobacco use disorder, ADHD, and obesity who presents to the clinic to establish care with me.  Patient recently moved from [place].  He was previously in [place] before he moved to [place] and was seen by a neurologist for his seizure disorder.  However, when patient came back from [place] to [place], the neurologist has moved.  Patient is currently seeking referral for neurology office.  He has no acute concerns or complaints at this time.    Regarding his seizure disorder, his last seizure was in January 2021, close to 2 years ago.  Patient continues to be on Depakote 750 mg twice daily.  He does not take any medications.    Family history:  Family history is positive for mother who had seizures, and grandmother who had seizures as well.  He has 4 siblings total.  All his brothers and sisters are otherwise healthy.    Social history:  Patient says that he previously drank a lot of alcohol, 12 beers a night, and 4 cups of whiskey.  However he stopped his heavy drinking in November 2020, currently drinks about 3 beers a week.  His last cigarette was about 4 weeks ago, does indulge in vaping with nicotine.  Patient also daily smokes weed, says that he smokes about 4 g of marijuana a day.      No problems updated.    Health Maintenance: Completed    ROS:   Review of Systems   Constitutional:  Negative for chills, fever and weight loss.   HENT:  Negative for congestion, hearing loss and sore throat.   "  Eyes:  Negative for blurred vision, double vision and discharge.   Respiratory:  Negative for cough, sputum production, shortness of breath and wheezing.    Cardiovascular:  Negative for chest pain, palpitations, orthopnea, claudication and leg swelling.   Gastrointestinal:  Negative for abdominal pain, blood in stool, constipation, diarrhea, heartburn, melena, nausea and vomiting.   Genitourinary:  Negative for dysuria, frequency, hematuria and urgency.   Musculoskeletal:  Negative for back pain, joint pain and neck pain.   Skin:  Negative for rash.   Neurological:  Negative for dizziness, sensory change, weakness and headaches.   Endo/Heme/Allergies:  Negative for environmental allergies. Does not bruise/bleed easily.   Psychiatric/Behavioral:  Negative for depression. The patient is not nervous/anxious and does not have insomnia.        Objective:     Exam: /65 (BP Location: Right arm, Patient Position: Sitting, BP Cuff Size: Adult)   Pulse 87   Temp 36.7 °C (98 °F) (Temporal)   Ht 1.727 m (5' 8\")   Wt 102 kg (225 lb)   SpO2 96%  Body mass index is 34.21 kg/m².    Physical Exam  Constitutional:       General: He is not in acute distress.     Appearance: Normal appearance. He is normal weight. He is not ill-appearing.   HENT:      Head: Normocephalic and atraumatic.      Right Ear: External ear normal.      Left Ear: External ear normal.      Nose: Nose normal. No rhinorrhea.      Mouth/Throat:      Mouth: Mucous membranes are moist.      Pharynx: Oropharynx is clear.   Eyes:      General:         Right eye: No discharge.         Left eye: No discharge.      Extraocular Movements: Extraocular movements intact.      Pupils: Pupils are equal, round, and reactive to light.   Neck:      Vascular: No carotid bruit.   Cardiovascular:      Rate and Rhythm: Normal rate and regular rhythm.      Pulses: Normal pulses.      Heart sounds: Normal heart sounds. No murmur heard.    No friction rub.   Pulmonary:      " Effort: Pulmonary effort is normal. No respiratory distress.      Breath sounds: Normal breath sounds. No wheezing or rhonchi.   Abdominal:      General: Abdomen is flat. Bowel sounds are normal.      Palpations: Abdomen is soft.      Tenderness: There is no abdominal tenderness. There is no right CVA tenderness or left CVA tenderness.      Hernia: No hernia is present.   Musculoskeletal:         General: No tenderness. Normal range of motion.      Cervical back: Normal range of motion. No rigidity.      Right lower leg: No edema.      Left lower leg: No edema.   Skin:     General: Skin is warm and dry.      Capillary Refill: Capillary refill takes less than 2 seconds.      Findings: No lesion or rash.   Neurological:      General: No focal deficit present.      Mental Status: He is alert and oriented to person, place, and time. Mental status is at baseline.   Psychiatric:         Mood and Affect: Mood normal.         Behavior: Behavior normal.         Thought Content: Thought content normal.         Judgment: Judgment normal.       A chaperone was offered to the patient during today's exam. Patient declined chaperone.    Labs:   No new labs to review at this time.    Assessment & Plan:   26 y.o. male with the following -    Seizure disorder (HCC)  This is a chronic condition.  Unknown etiology.  Patient's last seizure was roughly 2 years ago, in January 2021.  Patient has been doing really well since then.  He was previously living in [place] and was established with a neurology office, he then moved to [place].  However, on his return to [place], the neurologist he was seeing has moved practices.  Therefore, patient presents to request a new referral for neurology.  Currently patient is on Depakote 750 mg twice daily.  Patient tolerated the medication very well.  No acute concerns.  Plan:  Will continue Depakote 700 mg.  Place referral for neurology    Marijuana use, continuous  Patient smokes 4 g of marijuana a day.   This is a chronic problem.  Plan:  Counseled patient on side effects of marijuana which include but not limited to schizophrenia, anxiety, and neurocognitive disorders.  Patient acknowledges the risks, and suggest that he will try to decrease his intake.    Class 1 obesity in adult  Patient has a BMI of 34.21.  Plan:  Discussed healthy living, diet, and exercise.  Patient acknowledges the risk of class I obesity, including but not limited to hypertension, diabetes, TAQUERIA, fatty liver disease, and other metabolic disorders.  - Lipid Profile; Future  - HEMOGLOBIN A1C; Future  - TSH WITH REFLEX TO FT4; Future      I spent a total of 45 minutes with record review, exam, communication with the patient, communication with other providers, and documentation of this encounter.    Return in about 5 weeks (around 12/19/2022).    Please note that this dictation was created using voice recognition software. I have made every reasonable attempt to correct obvious errors, but I expect that there are errors of grammar and possibly content that I did not discover before finalizing the note.

## 2022-11-18 NOTE — ASSESSMENT & PLAN NOTE
This is a chronic condition.  Unknown etiology.  Patient's last seizure was roughly 2 years ago, in January 2021.  Patient has been doing really well since then.  He was previously living in [place] and was established with a neurology office, he then moved to [place].  However, on his return to [place], the neurologist he was seeing has moved practices.  Therefore, patient presents to request a new referral for neurology.  Currently patient is on Depakote 750 mg twice daily.  Patient tolerated the medication very well.  No acute concerns.  Plan:  Will continue Depakote 700 mg.  Place referral for neurology

## 2022-11-20 NOTE — ASSESSMENT & PLAN NOTE
Patient has a BMI of 34.21.  Plan:  Discussed healthy living, diet, and exercise.  Patient acknowledges the risk of class I obesity, including but not limited to hypertension, diabetes, TAQUERIA, fatty liver disease, and other metabolic disorders.

## 2022-11-20 NOTE — ASSESSMENT & PLAN NOTE
Patient smokes 4 g of marijuana a day.  This is a chronic problem.  Plan:  Counseled patient on side effects of marijuana which include but not limited to schizophrenia, anxiety, and neurocognitive disorders.  Patient acknowledges the risks, and suggest that he will try to decrease his intake.

## 2022-12-19 ENCOUNTER — OFFICE VISIT (OUTPATIENT)
Dept: INTERNAL MEDICINE | Facility: OTHER | Age: 27
End: 2022-12-19
Payer: COMMERCIAL

## 2022-12-19 VITALS
BODY MASS INDEX: 32.13 KG/M2 | HEART RATE: 85 BPM | WEIGHT: 212 LBS | TEMPERATURE: 98.1 F | SYSTOLIC BLOOD PRESSURE: 113 MMHG | HEIGHT: 68 IN | DIASTOLIC BLOOD PRESSURE: 53 MMHG | OXYGEN SATURATION: 95 %

## 2022-12-19 DIAGNOSIS — E66.09 CLASS 1 OBESITY DUE TO EXCESS CALORIES WITHOUT SERIOUS COMORBIDITY IN ADULT, UNSPECIFIED BMI: ICD-10-CM

## 2022-12-19 DIAGNOSIS — G40.909 SEIZURE DISORDER (HCC): ICD-10-CM

## 2022-12-19 PROCEDURE — 99213 OFFICE O/P EST LOW 20 MIN: CPT | Mod: GE | Performed by: STUDENT IN AN ORGANIZED HEALTH CARE EDUCATION/TRAINING PROGRAM

## 2022-12-19 ASSESSMENT — ENCOUNTER SYMPTOMS
WEAKNESS: 0
FEVER: 0
CHILLS: 0
PALPITATIONS: 0
DEPRESSION: 0
BLURRED VISION: 0
NAUSEA: 0
ABDOMINAL PAIN: 0
CONSTIPATION: 0
MYALGIAS: 0
HEADACHES: 0
COUGH: 0
DIARRHEA: 0
SHORTNESS OF BREATH: 0
DOUBLE VISION: 0
NERVOUS/ANXIOUS: 0
VOMITING: 0

## 2022-12-20 NOTE — ASSESSMENT & PLAN NOTE
- Pt provided counseling in clinic  - Denied need for Nutrition referral at this time  - Has lost 13 lbs since last visit  - Continue exercise (has been working with  to help him lose weight)

## 2022-12-20 NOTE — ASSESSMENT & PLAN NOTE
- Last true seizure in January 2021 - suspect episode in July was 2022 was more anxiety-related  - New referral placed to get patient established with Neurology  - Continue Depakote (750 mg, BID)

## 2023-07-27 NOTE — LETTER
[facility]  [address], [address] [place] [address]  Phone:  [phone] - Fax:  [phone]   [facility] Progress Report and Disability Certification  Date of Service: 12/21/2018   No Show:  No  Date / Time of Next Visit: 12/26/2018   Claim Information   Patient Name: Raymundo Vides  Claim Number:     Employer: [employer]  Date of Injury: 12/21/2018     Insurer / TPA: [employer]  ID / SSN:     Occupation:   Diagnosis: The encounter diagnosis was Contusion of left foot, initial encounter.    Medical Information   Related to Industrial Injury? Yes    Subjective Complaints:  DOI: 12/21/2018  Left foot ran over by [employer]. 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 Pain severity 3/10 at rest. Moderate with ambulation. Limping. Prior fracture and surgery of left foot when he was 12 or 14y/o. No laceration or abrasion. No OTC medications.    Objective Findings: Left foot: tender proximal/dorsal aspect. Mild STS. Skin intact. No malleolar tenderness. Ankle joint stable.    Pre-Existing Condition(s): Prior distal tibia fracture with intact hardware on xray   Assessment:   Initial Visit    Status: Additional Care Required  Permanent Disability:No    Plan:   Comments:walking boot, otc nsaid as needed    Diagnostics:   Comments:no fracture    Comments:       Disability Information   Status: Released to Restricted Duty    From:  12/22/2018  Through: 12/26/2018 Restrictions are: Temporary   Physical Restrictions   Sitting:    Standing:    Stooping:    Bending:      Squatting:    Walking:  < or = to 4 hrs/day Climbing:    Pushing:      Pulling:    Other:    Reaching Above Shoulder (L):   Reaching Above Shoulder (R):       Reaching Below Shoulder (L):    Reaching Below Shoulder (R):      Not to exceed Weight Limits   Carrying(hrs):   Weight Limit(lb):  < or = to 25 pounds Lifting(hrs):   Weight  Limit(lb): < or = to 25 pounds   Comments:      Repetitive Actions   Hands: i.e. Fine Manipulations from Grasping:     Feet: i.e. Operating Foot Controls:     Driving / Operate Machinery:     Physician Name: Alexandre Gallegos M.D. Physician Signature: ALEXANDRE Ramirez M.D. e-Signature: Dr. Sah Berumen, Medical Director   Clinic Name / Location: [address], [facility] [address]  [place] [address] Clinic Phone Number: Dept: [phone]   Appointment Time: 5:45 Pm Visit Start Time: 5:56 PM   Check-In Time:  5:48 Pm Visit Discharge Time:  1:36 PM   Original-Treating Physician or Chiropractor    Page 2-Insurer/TPA    Page 3-Employer    Page 4-Employee     4 = No assist / stand by assistance

## 2023-12-13 ENCOUNTER — APPOINTMENT (OUTPATIENT)
Dept: ADMISSIONS | Facility: MEDICAL CENTER | Age: 28
End: 2023-12-13
Attending: OBSTETRICS & GYNECOLOGY
Payer: COMMERCIAL

## 2023-12-15 ENCOUNTER — ANESTHESIA EVENT (OUTPATIENT)
Dept: SURGERY | Facility: MEDICAL CENTER | Age: 28
End: 2023-12-15
Payer: COMMERCIAL

## 2023-12-15 ENCOUNTER — APPOINTMENT (OUTPATIENT)
Dept: RADIOLOGY | Facility: MEDICAL CENTER | Age: 28
End: 2023-12-15
Attending: ORTHOPAEDIC SURGERY
Payer: COMMERCIAL

## 2023-12-15 ENCOUNTER — ANESTHESIA (OUTPATIENT)
Dept: SURGERY | Facility: MEDICAL CENTER | Age: 28
End: 2023-12-15
Payer: COMMERCIAL

## 2023-12-15 ENCOUNTER — HOSPITAL ENCOUNTER (OUTPATIENT)
Facility: MEDICAL CENTER | Age: 28
End: 2023-12-15
Attending: ORTHOPAEDIC SURGERY | Admitting: ORTHOPAEDIC SURGERY
Payer: COMMERCIAL

## 2023-12-15 ENCOUNTER — PHARMACY VISIT (OUTPATIENT)
Dept: PHARMACY | Facility: MEDICAL CENTER | Age: 28
End: 2023-12-15
Payer: COMMERCIAL

## 2023-12-15 VITALS
WEIGHT: 221.78 LBS | RESPIRATION RATE: 16 BRPM | SYSTOLIC BLOOD PRESSURE: 154 MMHG | OXYGEN SATURATION: 93 % | BODY MASS INDEX: 35.64 KG/M2 | TEMPERATURE: 97.8 F | HEART RATE: 87 BPM | HEIGHT: 66 IN | DIASTOLIC BLOOD PRESSURE: 76 MMHG

## 2023-12-15 DIAGNOSIS — G89.18 POST-OPERATIVE PAIN: ICD-10-CM

## 2023-12-15 LAB
ANION GAP SERPL CALC-SCNC: 12 MMOL/L (ref 7–16)
APTT PPP: 25.7 SEC (ref 24.7–36)
BUN SERPL-MCNC: 18 MG/DL (ref 8–22)
CALCIUM SERPL-MCNC: 9.5 MG/DL (ref 8.5–10.5)
CHLORIDE SERPL-SCNC: 103 MMOL/L (ref 96–112)
CO2 SERPL-SCNC: 23 MMOL/L (ref 20–33)
CREAT SERPL-MCNC: 0.82 MG/DL (ref 0.5–1.4)
ERYTHROCYTE [DISTWIDTH] IN BLOOD BY AUTOMATED COUNT: 39.9 FL (ref 35.9–50)
EST. AVERAGE GLUCOSE BLD GHB EST-MCNC: 111 MG/DL
GFR SERPLBLD CREATININE-BSD FMLA CKD-EPI: 124 ML/MIN/1.73 M 2
GLUCOSE SERPL-MCNC: 94 MG/DL (ref 65–99)
HBA1C MFR BLD: 5.5 % (ref 4–5.6)
HCT VFR BLD AUTO: 46.6 % (ref 42–52)
HGB BLD-MCNC: 15.9 G/DL (ref 14–18)
INR PPP: 1.03 (ref 0.87–1.13)
MCH RBC QN AUTO: 28.6 PG (ref 27–33)
MCHC RBC AUTO-ENTMCNC: 34.1 G/DL (ref 32.3–36.5)
MCV RBC AUTO: 84 FL (ref 81.4–97.8)
PLATELET # BLD AUTO: 301 K/UL (ref 164–446)
PMV BLD AUTO: 9.9 FL (ref 9–12.9)
POTASSIUM SERPL-SCNC: 4.1 MMOL/L (ref 3.6–5.5)
PROTHROMBIN TIME: 13.6 SEC (ref 12–14.6)
RBC # BLD AUTO: 5.55 M/UL (ref 4.7–6.1)
SODIUM SERPL-SCNC: 138 MMOL/L (ref 135–145)
WBC # BLD AUTO: 9.5 K/UL (ref 4.8–10.8)

## 2023-12-15 PROCEDURE — 160036 HCHG PACU - EA ADDL 30 MINS PHASE I: Performed by: ORTHOPAEDIC SURGERY

## 2023-12-15 PROCEDURE — RXMED WILLOW AMBULATORY MEDICATION CHARGE: Performed by: NURSE PRACTITIONER

## 2023-12-15 PROCEDURE — 700111 HCHG RX REV CODE 636 W/ 250 OVERRIDE (IP): Mod: UD | Performed by: ANESTHESIOLOGY

## 2023-12-15 PROCEDURE — 160048 HCHG OR STATISTICAL LEVEL 1-5: Performed by: ORTHOPAEDIC SURGERY

## 2023-12-15 PROCEDURE — 85610 PROTHROMBIN TIME: CPT

## 2023-12-15 PROCEDURE — 71045 X-RAY EXAM CHEST 1 VIEW: CPT

## 2023-12-15 PROCEDURE — 85730 THROMBOPLASTIN TIME PARTIAL: CPT

## 2023-12-15 PROCEDURE — 160029 HCHG SURGERY MINUTES - 1ST 30 MINS LEVEL 4: Performed by: ORTHOPAEDIC SURGERY

## 2023-12-15 PROCEDURE — 700105 HCHG RX REV CODE 258: Mod: UD | Performed by: ORTHOPAEDIC SURGERY

## 2023-12-15 PROCEDURE — 83036 HEMOGLOBIN GLYCOSYLATED A1C: CPT

## 2023-12-15 PROCEDURE — 700101 HCHG RX REV CODE 250: Mod: UD | Performed by: ORTHOPAEDIC SURGERY

## 2023-12-15 PROCEDURE — 700102 HCHG RX REV CODE 250 W/ 637 OVERRIDE(OP): Mod: UD | Performed by: ANESTHESIOLOGY

## 2023-12-15 PROCEDURE — 160025 RECOVERY II MINUTES (STATS): Performed by: ORTHOPAEDIC SURGERY

## 2023-12-15 PROCEDURE — 73610 X-RAY EXAM OF ANKLE: CPT | Mod: RT

## 2023-12-15 PROCEDURE — 700111 HCHG RX REV CODE 636 W/ 250 OVERRIDE (IP): Mod: JZ,UD | Performed by: ANESTHESIOLOGY

## 2023-12-15 PROCEDURE — 160002 HCHG RECOVERY MINUTES (STAT): Performed by: ORTHOPAEDIC SURGERY

## 2023-12-15 PROCEDURE — 700101 HCHG RX REV CODE 250: Mod: UD | Performed by: ANESTHESIOLOGY

## 2023-12-15 PROCEDURE — 160009 HCHG ANES TIME/MIN: Performed by: ORTHOPAEDIC SURGERY

## 2023-12-15 PROCEDURE — 700111 HCHG RX REV CODE 636 W/ 250 OVERRIDE (IP): Mod: UD | Performed by: ORTHOPAEDIC SURGERY

## 2023-12-15 PROCEDURE — 160041 HCHG SURGERY MINUTES - EA ADDL 1 MIN LEVEL 4: Performed by: ORTHOPAEDIC SURGERY

## 2023-12-15 PROCEDURE — 85652 RBC SED RATE AUTOMATED: CPT

## 2023-12-15 PROCEDURE — 85027 COMPLETE CBC AUTOMATED: CPT

## 2023-12-15 PROCEDURE — 80048 BASIC METABOLIC PNL TOTAL CA: CPT

## 2023-12-15 PROCEDURE — 160035 HCHG PACU - 1ST 60 MINS PHASE I: Performed by: ORTHOPAEDIC SURGERY

## 2023-12-15 PROCEDURE — 160046 HCHG PACU - 1ST 60 MINS PHASE II: Performed by: ORTHOPAEDIC SURGERY

## 2023-12-15 PROCEDURE — C1713 ANCHOR/SCREW BN/BN,TIS/BN: HCPCS | Performed by: ORTHOPAEDIC SURGERY

## 2023-12-15 PROCEDURE — A9270 NON-COVERED ITEM OR SERVICE: HCPCS | Mod: UD | Performed by: ANESTHESIOLOGY

## 2023-12-15 PROCEDURE — 700105 HCHG RX REV CODE 258: Mod: UD | Performed by: ANESTHESIOLOGY

## 2023-12-15 DEVICE — IMPLANTABLE DEVICE: Type: IMPLANTABLE DEVICE | Site: ANKLE | Status: FUNCTIONAL

## 2023-12-15 DEVICE — SCREW BONE T10 FULL THREAD L18 MM OD2.7 MM STARDRIVE NONSTERILE VARIAX FOOT PLATE SYSTEM: Type: IMPLANTABLE DEVICE | Site: ANKLE | Status: FUNCTIONAL

## 2023-12-15 DEVICE — SCREW BONE T10 FULL THREAD L16 MM OD2.7 MM STARDRIVE NONSTERILE VARIAX FOOT PLATE SYSTEM: Type: IMPLANTABLE DEVICE | Site: ANKLE | Status: FUNCTIONAL

## 2023-12-15 DEVICE — SCREW BONE T10 FULL THREAD L14 MM OD2.7 MM STARDRIVE NONSTERILE VARIAX FOOT PLATE SYSTEM: Type: IMPLANTABLE DEVICE | Site: ANKLE | Status: FUNCTIONAL

## 2023-12-15 RX ORDER — MIDAZOLAM HYDROCHLORIDE 1 MG/ML
1 INJECTION INTRAMUSCULAR; INTRAVENOUS
Status: DISCONTINUED | OUTPATIENT
Start: 2023-12-15 | End: 2023-12-15 | Stop reason: HOSPADM

## 2023-12-15 RX ORDER — HYDROCODONE BITARTRATE AND ACETAMINOPHEN 7.5; 325 MG/1; MG/1
1 TABLET ORAL EVERY 4 HOURS PRN
Qty: 38 TABLET | Refills: 0 | Status: SHIPPED | OUTPATIENT
Start: 2023-12-15 | End: 2023-12-22

## 2023-12-15 RX ORDER — SODIUM CHLORIDE, SODIUM LACTATE, POTASSIUM CHLORIDE, CALCIUM CHLORIDE 600; 310; 30; 20 MG/100ML; MG/100ML; MG/100ML; MG/100ML
INJECTION, SOLUTION INTRAVENOUS CONTINUOUS
Status: ACTIVE | OUTPATIENT
Start: 2023-12-15 | End: 2023-12-15

## 2023-12-15 RX ORDER — MEPERIDINE HYDROCHLORIDE 25 MG/ML
12.5 INJECTION INTRAMUSCULAR; INTRAVENOUS; SUBCUTANEOUS
Status: DISCONTINUED | OUTPATIENT
Start: 2023-12-15 | End: 2023-12-15 | Stop reason: HOSPADM

## 2023-12-15 RX ORDER — CEFAZOLIN SODIUM 1 G/3ML
2 INJECTION, POWDER, FOR SOLUTION INTRAMUSCULAR; INTRAVENOUS ONCE
Status: DISCONTINUED | OUTPATIENT
Start: 2023-12-15 | End: 2023-12-15 | Stop reason: HOSPADM

## 2023-12-15 RX ORDER — HYDROMORPHONE HYDROCHLORIDE 1 MG/ML
0.1 INJECTION, SOLUTION INTRAMUSCULAR; INTRAVENOUS; SUBCUTANEOUS
Status: DISCONTINUED | OUTPATIENT
Start: 2023-12-15 | End: 2023-12-15 | Stop reason: HOSPADM

## 2023-12-15 RX ORDER — DIPHENHYDRAMINE HYDROCHLORIDE 50 MG/ML
12.5 INJECTION INTRAMUSCULAR; INTRAVENOUS
Status: DISCONTINUED | OUTPATIENT
Start: 2023-12-15 | End: 2023-12-15 | Stop reason: HOSPADM

## 2023-12-15 RX ORDER — SODIUM CHLORIDE, SODIUM LACTATE, POTASSIUM CHLORIDE, CALCIUM CHLORIDE 600; 310; 30; 20 MG/100ML; MG/100ML; MG/100ML; MG/100ML
INJECTION, SOLUTION INTRAVENOUS CONTINUOUS
Status: DISCONTINUED | OUTPATIENT
Start: 2023-12-15 | End: 2023-12-15 | Stop reason: HOSPADM

## 2023-12-15 RX ORDER — HYDROCODONE BITARTRATE AND ACETAMINOPHEN 10; 325 MG/1; MG/1
1 TABLET ORAL EVERY 4 HOURS PRN
Qty: 42 TABLET | Refills: 0 | Status: SHIPPED | OUTPATIENT
Start: 2023-12-15 | End: 2023-12-15

## 2023-12-15 RX ORDER — MAGNESIUM SULFATE HEPTAHYDRATE 40 MG/ML
INJECTION, SOLUTION INTRAVENOUS PRN
Status: DISCONTINUED | OUTPATIENT
Start: 2023-12-15 | End: 2023-12-15 | Stop reason: SURG

## 2023-12-15 RX ORDER — ONDANSETRON 2 MG/ML
4 INJECTION INTRAMUSCULAR; INTRAVENOUS
Status: DISCONTINUED | OUTPATIENT
Start: 2023-12-15 | End: 2023-12-15 | Stop reason: HOSPADM

## 2023-12-15 RX ORDER — HYDROMORPHONE HYDROCHLORIDE 1 MG/ML
0.4 INJECTION, SOLUTION INTRAMUSCULAR; INTRAVENOUS; SUBCUTANEOUS
Status: DISCONTINUED | OUTPATIENT
Start: 2023-12-15 | End: 2023-12-15 | Stop reason: HOSPADM

## 2023-12-15 RX ORDER — OXYCODONE HCL 5 MG/5 ML
5 SOLUTION, ORAL ORAL
Status: DISCONTINUED | OUTPATIENT
Start: 2023-12-15 | End: 2023-12-15 | Stop reason: HOSPADM

## 2023-12-15 RX ORDER — EPHEDRINE SULFATE 50 MG/ML
5 INJECTION, SOLUTION INTRAVENOUS
Status: DISCONTINUED | OUTPATIENT
Start: 2023-12-15 | End: 2023-12-15 | Stop reason: HOSPADM

## 2023-12-15 RX ORDER — DEXAMETHASONE SODIUM PHOSPHATE 4 MG/ML
INJECTION, SOLUTION INTRA-ARTICULAR; INTRALESIONAL; INTRAMUSCULAR; INTRAVENOUS; SOFT TISSUE PRN
Status: DISCONTINUED | OUTPATIENT
Start: 2023-12-15 | End: 2023-12-15 | Stop reason: SURG

## 2023-12-15 RX ORDER — HYDROMORPHONE HYDROCHLORIDE 1 MG/ML
0.2 INJECTION, SOLUTION INTRAMUSCULAR; INTRAVENOUS; SUBCUTANEOUS
Status: DISCONTINUED | OUTPATIENT
Start: 2023-12-15 | End: 2023-12-15 | Stop reason: HOSPADM

## 2023-12-15 RX ORDER — ACETAMINOPHEN 500 MG
500 TABLET ORAL
Status: ON HOLD | COMMUNITY
End: 2023-12-15

## 2023-12-15 RX ORDER — LABETALOL HYDROCHLORIDE 5 MG/ML
5 INJECTION, SOLUTION INTRAVENOUS
Status: DISCONTINUED | OUTPATIENT
Start: 2023-12-15 | End: 2023-12-15 | Stop reason: HOSPADM

## 2023-12-15 RX ORDER — OXYCODONE HCL 10 MG/1
10 TABLET, FILM COATED, EXTENDED RELEASE ORAL ONCE
Status: COMPLETED | OUTPATIENT
Start: 2023-12-15 | End: 2023-12-15

## 2023-12-15 RX ORDER — HALOPERIDOL 5 MG/ML
1 INJECTION INTRAMUSCULAR
Status: DISCONTINUED | OUTPATIENT
Start: 2023-12-15 | End: 2023-12-15 | Stop reason: HOSPADM

## 2023-12-15 RX ORDER — HYDROCODONE BITARTRATE AND ACETAMINOPHEN 5; 325 MG/1; MG/1
1 TABLET ORAL EVERY 4 HOURS PRN
Status: ON HOLD | COMMUNITY
End: 2023-12-15

## 2023-12-15 RX ORDER — SODIUM CHLORIDE, SODIUM LACTATE, POTASSIUM CHLORIDE, CALCIUM CHLORIDE 600; 310; 30; 20 MG/100ML; MG/100ML; MG/100ML; MG/100ML
INJECTION, SOLUTION INTRAVENOUS
Status: DISCONTINUED | OUTPATIENT
Start: 2023-12-15 | End: 2023-12-15 | Stop reason: SURG

## 2023-12-15 RX ORDER — MIDAZOLAM HYDROCHLORIDE 1 MG/ML
INJECTION INTRAMUSCULAR; INTRAVENOUS PRN
Status: DISCONTINUED | OUTPATIENT
Start: 2023-12-15 | End: 2023-12-15 | Stop reason: SURG

## 2023-12-15 RX ORDER — CELECOXIB 200 MG/1
400 CAPSULE ORAL ONCE
Status: COMPLETED | OUTPATIENT
Start: 2023-12-15 | End: 2023-12-15

## 2023-12-15 RX ORDER — DEXMEDETOMIDINE HYDROCHLORIDE 100 UG/ML
INJECTION, SOLUTION INTRAVENOUS PRN
Status: DISCONTINUED | OUTPATIENT
Start: 2023-12-15 | End: 2023-12-15 | Stop reason: SURG

## 2023-12-15 RX ORDER — OXYCODONE HCL 5 MG/5 ML
10 SOLUTION, ORAL ORAL
Status: COMPLETED | OUTPATIENT
Start: 2023-12-15 | End: 2023-12-15

## 2023-12-15 RX ORDER — ONDANSETRON 2 MG/ML
INJECTION INTRAMUSCULAR; INTRAVENOUS PRN
Status: DISCONTINUED | OUTPATIENT
Start: 2023-12-15 | End: 2023-12-15 | Stop reason: SURG

## 2023-12-15 RX ORDER — ACETAMINOPHEN 500 MG
1000 TABLET ORAL ONCE
Status: COMPLETED | OUTPATIENT
Start: 2023-12-15 | End: 2023-12-15

## 2023-12-15 RX ORDER — OXYCODONE HCL 5 MG/5 ML
5 SOLUTION, ORAL ORAL
Status: COMPLETED | OUTPATIENT
Start: 2023-12-15 | End: 2023-12-15

## 2023-12-15 RX ORDER — OXYCODONE HCL 5 MG/5 ML
10 SOLUTION, ORAL ORAL
Status: DISCONTINUED | OUTPATIENT
Start: 2023-12-15 | End: 2023-12-15 | Stop reason: HOSPADM

## 2023-12-15 RX ORDER — LIDOCAINE HYDROCHLORIDE 20 MG/ML
INJECTION, SOLUTION EPIDURAL; INFILTRATION; INTRACAUDAL; PERINEURAL PRN
Status: DISCONTINUED | OUTPATIENT
Start: 2023-12-15 | End: 2023-12-15 | Stop reason: SURG

## 2023-12-15 RX ORDER — HYDROMORPHONE HYDROCHLORIDE 2 MG/ML
INJECTION, SOLUTION INTRAMUSCULAR; INTRAVENOUS; SUBCUTANEOUS PRN
Status: DISCONTINUED | OUTPATIENT
Start: 2023-12-15 | End: 2023-12-15 | Stop reason: SURG

## 2023-12-15 RX ORDER — BUPIVACAINE HYDROCHLORIDE AND EPINEPHRINE 5; 5 MG/ML; UG/ML
INJECTION, SOLUTION EPIDURAL; INTRACAUDAL; PERINEURAL
Status: DISCONTINUED | OUTPATIENT
Start: 2023-12-15 | End: 2023-12-15 | Stop reason: HOSPADM

## 2023-12-15 RX ORDER — CEFAZOLIN SODIUM 1 G/3ML
INJECTION, POWDER, FOR SOLUTION INTRAMUSCULAR; INTRAVENOUS PRN
Status: DISCONTINUED | OUTPATIENT
Start: 2023-12-15 | End: 2023-12-15 | Stop reason: SURG

## 2023-12-15 RX ADMIN — LIDOCAINE HYDROCHLORIDE 100 MG: 20 INJECTION, SOLUTION EPIDURAL; INFILTRATION; INTRACAUDAL at 13:29

## 2023-12-15 RX ADMIN — HYDROMORPHONE HYDROCHLORIDE 0.4 MG: 1 INJECTION, SOLUTION INTRAMUSCULAR; INTRAVENOUS; SUBCUTANEOUS at 15:41

## 2023-12-15 RX ADMIN — CELECOXIB 400 MG: 200 CAPSULE ORAL at 12:34

## 2023-12-15 RX ADMIN — FENTANYL CITRATE 50 MCG: 50 INJECTION, SOLUTION INTRAMUSCULAR; INTRAVENOUS at 15:29

## 2023-12-15 RX ADMIN — CEFAZOLIN 2 G: 1 INJECTION, POWDER, FOR SOLUTION INTRAMUSCULAR; INTRAVENOUS at 13:31

## 2023-12-15 RX ADMIN — HYDROMORPHONE HYDROCHLORIDE 0.3 MG: 2 INJECTION INTRAMUSCULAR; INTRAVENOUS; SUBCUTANEOUS at 13:35

## 2023-12-15 RX ADMIN — VANCOMYCIN HYDROCHLORIDE 1500 MG: 5 INJECTION, POWDER, LYOPHILIZED, FOR SOLUTION INTRAVENOUS at 12:30

## 2023-12-15 RX ADMIN — OXYCODONE HYDROCHLORIDE 10 MG: 10 TABLET, FILM COATED, EXTENDED RELEASE ORAL at 12:34

## 2023-12-15 RX ADMIN — SODIUM CHLORIDE, POTASSIUM CHLORIDE, SODIUM LACTATE AND CALCIUM CHLORIDE: 600; 310; 30; 20 INJECTION, SOLUTION INTRAVENOUS at 12:30

## 2023-12-15 RX ADMIN — PROPOFOL 160 MG: 10 INJECTION, EMULSION INTRAVENOUS at 13:29

## 2023-12-15 RX ADMIN — SODIUM CHLORIDE, POTASSIUM CHLORIDE, SODIUM LACTATE AND CALCIUM CHLORIDE: 600; 310; 30; 20 INJECTION, SOLUTION INTRAVENOUS at 13:26

## 2023-12-15 RX ADMIN — HYDROMORPHONE HYDROCHLORIDE 0.4 MG: 2 INJECTION INTRAMUSCULAR; INTRAVENOUS; SUBCUTANEOUS at 13:46

## 2023-12-15 RX ADMIN — DEXAMETHASONE SODIUM PHOSPHATE 8 MG: 4 INJECTION INTRA-ARTICULAR; INTRALESIONAL; INTRAMUSCULAR; INTRAVENOUS; SOFT TISSUE at 13:30

## 2023-12-15 RX ADMIN — MIDAZOLAM HYDROCHLORIDE 2 MG: 1 INJECTION, SOLUTION INTRAMUSCULAR; INTRAVENOUS at 13:26

## 2023-12-15 RX ADMIN — FENTANYL CITRATE 50 MCG: 50 INJECTION, SOLUTION INTRAMUSCULAR; INTRAVENOUS at 15:36

## 2023-12-15 RX ADMIN — Medication 25 MG: at 14:46

## 2023-12-15 RX ADMIN — HYDROMORPHONE HYDROCHLORIDE 0.5 MG: 2 INJECTION INTRAMUSCULAR; INTRAVENOUS; SUBCUTANEOUS at 14:11

## 2023-12-15 RX ADMIN — OXYCODONE HYDROCHLORIDE 10 MG: 5 SOLUTION ORAL at 15:30

## 2023-12-15 RX ADMIN — MAGNESIUM SULFATE HEPTAHYDRATE 4 G: 2 INJECTION, SOLUTION INTRAVENOUS at 14:31

## 2023-12-15 RX ADMIN — HYDROMORPHONE HYDROCHLORIDE 0.5 MG: 2 INJECTION INTRAMUSCULAR; INTRAVENOUS; SUBCUTANEOUS at 13:58

## 2023-12-15 RX ADMIN — ACETAMINOPHEN 1000 MG: 500 TABLET, FILM COATED ORAL at 12:34

## 2023-12-15 RX ADMIN — DEXMEDETOMIDINE HYDROCHLORIDE 40 MCG: 100 INJECTION, SOLUTION INTRAVENOUS at 13:59

## 2023-12-15 RX ADMIN — Medication 25 MG: at 14:06

## 2023-12-15 RX ADMIN — ONDANSETRON 4 MG: 2 INJECTION INTRAMUSCULAR; INTRAVENOUS at 13:30

## 2023-12-15 RX ADMIN — HYDROMORPHONE HYDROCHLORIDE 0.3 MG: 2 INJECTION INTRAMUSCULAR; INTRAVENOUS; SUBCUTANEOUS at 13:40

## 2023-12-15 ASSESSMENT — PAIN DESCRIPTION - PAIN TYPE
TYPE: ACUTE PAIN

## 2023-12-15 ASSESSMENT — PAIN SCALES - GENERAL: PAIN_LEVEL: 5

## 2023-12-15 NOTE — PROGRESS NOTES
Medication history reviewed with PT at bedside    [facility] rec is complete per PT reporting    Allergies reviewed.     Patient denies any outpatient antibiotics in the last 30 days.     Patient is not taking anticoagulants.    Preferred pharmacy for this visit - [facility] in [place] ([phone])

## 2023-12-15 NOTE — OR SURGEON
Immediate Post OP Note    PreOp Diagnosis: r ankle per3 fracture with sendesmotic widening      PostOp Diagnosis: same      Procedure(s):  RIGHT ANKLE FRACTURE OPEN REDUCTION INTERNAL FIXATION WITH SYNDESMODIC SCREWS- Wound Class: Clean    Surgeon(s):  Chuy Pham M.D.    Anesthesiologist/Type of Anesthesia:  Anesthesiologist: Re Parikh M.D./General    Surgical Staff:  Assistant: ALLYSSA Doyle  Circulator: Lukas D. Gansert, R.N.  Scrub Person: Geoff Joseph  Radiology Technologist: Mary Terry; Enedina Madera    Specimens removed if any:  * No specimens in log *    Estimated Blood Loss: 10cc    Findings: none    Complications: none        12/15/2023 3:13 PM Chuy Pham M.D.

## 2023-12-15 NOTE — ANESTHESIA PROCEDURE NOTES
Airway    Date/Time: 12/15/2023 1:30 PM    Performed by: Re Parikh M.D.  Authorized by: Re Parikh M.D.    Location:  OR  Urgency:  Elective  Indications for Airway Management:  Anesthesia      Spontaneous Ventilation: absent    Sedation Level:  Deep  Preoxygenated: Yes    Patient Position:  Sniffing  MILS Maintained Throughout: Yes    Mask Difficulty Assessment:  1 - vent by mask  Final Airway Type:  Supraglottic airway  Final Supraglottic Airway:  Standard LMA    SGA Size:  4  Number of Attempts at Approach:  1

## 2023-12-15 NOTE — ANESTHESIA PREPROCEDURE EVALUATION
Case: [id number] Date/Time: 12/15/23 1345    Procedure: RIGHT ANKLE FRACTURE OPEN REDUCTION INTERNAL FIXATION WITH SYNDESMODIC SCREW    Pre-op diagnosis: RIGHT ANKLE FRACTURE FIBULA WITH SYNDESMOTIC WIDENING    Location: [facility] OR 09 / SURGERY [facility]    Surgeons: Chuy Pham M.D.          28yoM with seizure do, GERD    NPO  No AC  BMi 32  NKDA    Relevant Problems   NEURO   (positive) Seizure disorder (HCC)      GI   (positive) Gastroesophageal reflux disease without esophagitis       Physical Exam    Airway   Mallampati: II       Cardiovascular - normal exam     Dental - normal exam           Pulmonary - normal exam     Abdominal - normal exam  (+) obese     Neurological - normal exam                   Anesthesia Plan    ASA 2       Plan - general       Airway plan will be LMA          Induction: intravenous    Postoperative Plan: Postoperative administration of opioids is intended.    Pertinent diagnostic labs and testing reviewed    Informed Consent:    Anesthetic plan and risks discussed with patient.

## 2023-12-15 NOTE — ANESTHESIA TIME REPORT
Anesthesia Start and Stop Event Times       Date Time Event    12/15/2023 1245 Ready for Procedure     1326 Anesthesia Start     1510 Anesthesia Stop          Responsible Staff  12/15/23      Name Role Begin End    Re Parikh M.D. Anesth 1326 1510          Overtime Reason:  no overtime (within assigned shift)    Comments:

## 2023-12-15 NOTE — ANESTHESIA POSTPROCEDURE EVALUATION
Patient: Raymundo Vides    Procedure Summary       Date: 12/15/23 Room / Location: [facility] 09 / SURGERY [facility]    Anesthesia Start: 1326 Anesthesia Stop: 1510    Procedure: RIGHT ANKLE FRACTURE OPEN REDUCTION INTERNAL FIXATION WITH SYNDESMODIC SCREW (Right: Ankle) Diagnosis: (RIGHT ANKLE FRACTURE FIBULA WITH SYNDESMOTIC WIDENING)    Surgeons: Chuy Pham M.D. Responsible Provider: Re Parikh M.D.    Anesthesia Type: general ASA Status: 2            Final Anesthesia Type: general  Last vitals  BP   Blood Pressure: (!) 140/78    Temp   36.2 °C (97.2 °F)    Pulse   98   Resp   18    SpO2   97 %      Anesthesia Post Evaluation    Patient location during evaluation: PACU  Patient participation: complete - patient participated  Level of consciousness: awake and alert  Pain score: 5    Airway patency: patent  Anesthetic complications: no  Cardiovascular status: adequate and hemodynamically stable  Respiratory status: acceptable  Hydration status: acceptable    PONV: none          No notable events documented.     Nurse Pain Score: 0 (NPRS)           RN cannot approve Refill Request    RN can NOT refill this medication med is not covered by policy/route to provider. Last office visit: 2/1/2021 Jose Rangel MD Last Physical: Visit date not found Last MTM visit: Visit date not found Last visit same specialty: 3/1/2021 Kayrn Guzmán PA-C.  Next visit within 3 mo: Visit date not found  Next physical within 3 mo: Visit date not found      Candelario LORENZORupesh Rollins, [facility] [place] Connection Triage/Med Refill 4/22/2021    Requested Prescriptions   Pending Prescriptions Disp Refills     OLANZapine (ZYPREXA) 5 MG tablet [Pharmacy Med Name: OLANZAPINE 5 MG TABS 5 Tablet] 30 tablet 3     Sig: TAKE 1 PILL BY MOUTH IN THE MORNING FOR ANXIETY       There is no refill protocol information for this order        acetaminophen (TYLENOL) 325 MG tablet [Pharmacy Med Name: ACETAMINOPHEN 325 MG TABS 325 Tablet] 30 tablet 0     Sig: TAKE 1-2 TABLETS (650 MG TOTAL) BY MOUTH EVERY 6 (SIX) HOURS AS NEEDED FOR PAIN.       There is no refill protocol information for this order      Signed Prescriptions Disp Refills    levothyroxine (SYNTHROID, LEVOTHROID) 75 MCG tablet 90 tablet 3     Sig: TAKE 1 TABLET (75 MCG TOTAL) BY MOUTH DAILY FOR THYROID       Thyroid Hormones Protocol Passed - 4/21/2021 11:49 AM        Passed - Provider visit in past 12 months or next 3 months     Last office visit with prescriber/PCP: 2/1/2021 Jose Rangel MD OR same dept: 3/1/2021 Karyn Guzmán PA-C OR same specialty: 3/1/2021 Karyn Guzmán PA-C  Last physical: Visit date not found Last MTM visit: Visit date not found   Next visit within 3 mo: Visit date not found  Next physical within 3 mo: Visit date not found  Prescriber OR PCP: Jose Rangel MD  Last diagnosis associated with med order: 1. Hypothyroidism  - levothyroxine (SYNTHROID, LEVOTHROID) 75 MCG tablet; TAKE 1 TABLET (75 MCG TOTAL) BY MOUTH DAILY FOR THYROID  Dispense: 90 tablet; Refill: 3    2. Auditory hallucination  - OLANZapine  (ZYPREXA) 5 MG tablet [Pharmacy Med Name: OLANZAPINE 5 MG TABS 5 Tablet]; TAKE 1 PILL BY MOUTH IN THE MORNING FOR ANXIETY  Dispense: 30 tablet; Refill: 3    3. Moderate persistent asthma without complication  - acetaminophen (TYLENOL) 325 MG tablet [Pharmacy Med Name: ACETAMINOPHEN 325 MG TABS 325 Tablet]; TAKE 1-2 TABLETS (650 MG TOTAL) BY MOUTH EVERY 6 (SIX) HOURS AS NEEDED FOR PAIN.  Dispense: 30 tablet; Refill: 0    If protocol passes may refill for 12 months if within 3 months of last provider visit (or a total of 15 months).             Passed - TSH on file in past 12 months for patient age 12 & older     TSH   Date Value Ref Range Status   04/12/2021 2.34 0.30 - 5.00 uIU/mL Final

## 2023-12-16 LAB — ERYTHROCYTE [SEDIMENTATION RATE] IN BLOOD BY WESTERGREN METHOD: 4 MM/HOUR (ref 0–20)

## 2023-12-16 NOTE — OR NURSING
Pt's VSS; denies N/V; states pain is at tolerable level. Dressing CDI to RLE. D/c orders received. IV dc'd. Pt changed into clothing with assistance. Pt up and ambulated to BR, steady gait, voided adequately. Discharge instructions given as well as pain management handout; pt and family verbalized understanding and questions answered. Patient states ready to d/c home. No prescriptions given. Pt dc'd in w/c with mom in stable condition. Discussed with pt and mom advising against using marijuana or alcohol with the pain meds given. Both verbalized understanding.

## 2023-12-16 NOTE — OP REPORT
DATE OF SERVICE:  12/15/2023     SERVICE:  Orthopedic surgery.     PREOPERATIVE DIAGNOSES:  Right ankle pronation external rotation 3 fracture   with fracture of the fibular diaphysis and syndesmotic widening with partial   rupture of the deltoid and medial ligament.     POSTOPERATIVE DIAGNOSES:  Right ankle pronation external rotation 3 fracture   with fracture of the fibular diaphysis and syndesmotic widening with partial   rupture of the deltoid and medial ligament.     PROCEDURES:  Open reduction and internal fixation PER 4 ankle fracture with   lateral fibular plate and two syndesmotic reducing screws.  Hardware used was   Dafne 10 hole 1/3 tubular plate.     SURGEON:  Chuy Pham MD     ASSISTANT SURGEON:  PIERRE Doyle CFA     ANESTHETIC:  General.     ANESTHESIOLOGIST:  Re Parikh MD     COMPLICATIONS:  None.     BLOOD LOSS:  10 mL.     TOURNIQUET TIME:  Approximately 70 minutes.     HISTORY AND INDICATIONS:  The patient is a 28-year-old who approximately 2   weeks ago suffered a twisting injury while riding a skateboard in [place], [place].  He was seen and splinted and sent for permanent treatment.  I saw   him when he was approximately 13 days out of this fracture.  He had an   obvious fibular fracture and syndesmotic rupture with significant widening of   the medial clear space and partial rupture of the deltoid ligament.  Because   of this, today's procedure was performed.  An assistant in the form of nurse   practitioner, Sindhu Costa was necessary for this procedure for multiple   parts of the procedure including holding retractors and compression during   reduction of the syndesmotic reduction.  This operation would have been   suboptimal without the help of an assistant midlevel.     DESCRIPTION OF PROCEDURE:  After informed consent was obtained, the patient   was brought to the operating room and given general anesthetic.  His right   lower extremity was prepped  and draped in the usual sterile fashion after   vancomycin and Ancef were given.  After the field was draped, a time-out was   called correctly identifying the patient and the procedure to be done.  The   limb was then exsanguinated and tourniquet was inflated to 250 mmHg.  We then   made a longitudinal incision overlying the fibula laterally.  X-ray   visualization was used to confirm the position of our incision.  We carefully   dissected down through subcutaneous tissue down to the fascial layer.  We   carefully dissected with dissecting scissors in the area of the peroneal nerve   to avoid any damage to the peroneal nerve.  We dissected down to the fibula   and exposed it.  We then examined the fracture site.  There was already some   significant amount of healing callus that had developed.  Rongeur was used to   remove part of this to obtain a good reduction as possible.  However, a   perfect reduction was not possible secondary to the length of time.  We then   used the bone holding instruments to perform some reduction of this fracture   and bring it out to length.  A 10 hole 1/3 tubular Dafne plate was then   used.  We first placed the proximal screws.  Each screw was placed in standard   fashion.  First, a 2.7 drill was used, then a depth gauge.  This was followed   by a self-tapping screw.  After placing the proximal screws, used a crab claw   tenaculum to pull on the fibula to pull it out to length and partially   reduced the fracture.  We did also place interfragmentary screws across the   plate as well.  The distal screws were then placed in standard fashion again   using a drill, followed by depth gauge and the appropriate length of screw was   placed.  The two distal screws were used for syndesmotic reduction.  We first   drilled the fibular cortices with 3.5 drill, then a 2.0 drill was used to   drill the tibia.  Frequent fluoroscopic images were used to confirm an   excellent position of our  trajectory.  We then used the depth gauge and then   placed 2 screws distally and then carefully reduced the fibular tibial   syndesmotic relationship using compression from the screws.  An excellent   reduction was achieved.  We also applied compression while the screws were   placed to reduce the fibular tibial relationship and reduced the medial clear   space.  An excellent reduction was achieved.  All screws were then revisited   and tightened once again.  Screw lengths were checked.  Hardcopy x-rays were   taken off the fluoroscopic machine.  The wound was irrigated with copious   amounts of irrigation and closed.     The subcutaneous layer was closed with 2-0 Vicryl.  The skin closure was   completed with staples and Prineo Dermabond mesh glue closure.  We injected 10   mL of 0.5% Marcaine into the wound.  Wound dressing was applied.  A splint   was applied. The ankle was held at 90 degrees as the splint hardened.  The   patient was aroused from general anesthesia and brought in stable condition to   recovery room.  No complications were experienced.  Blood loss was 10 mL or   less and tourniquet time was 70 minutes.        ______________________________  RAMON DUNHAM MD    JHO/GINNA    DD:  12/15/2023 15:22  DT:  12/15/2023 17:46    Job#:  [id number]

## 2023-12-16 NOTE — DISCHARGE INSTRUCTIONS
HOME CARE INSTRUCTIONS    ACTIVITY: Rest and take it easy for the first 24 hours.  A responsible adult is recommended to remain with you during that time.  It is normal to feel sleepy.  We encourage you to not do anything that requires balance, judgment or coordination.    FOR 24 HOURS DO NOT:  Drive, operate machinery or run household appliances.  Drink beer or alcoholic beverages.  Make important decisions or sign legal documents.    DIET: To avoid nausea, slowly advance diet as tolerated, avoiding spicy or greasy foods for the first day.  Add more substantial food to your diet according to your physician's instructions.   INCREASE FLUIDS AND FIBER TO AVOID CONSTIPATION.    MEDICATIONS: Resume taking daily medication.  Take prescribed pain medication with food.  If no medication is prescribed, you may take non-aspirin pain medication if needed.  PAIN MEDICATION CAN BE VERY CONSTIPATING.  Take a stool softener or laxative such as senokot, pericolace, or milk of magnesia if needed.    Prescription given for -HYDROcodone-acetaminophen (NORCO) 7.5-325 MG tab  Last pain medication given at -.oxyCODONE (Roxicodone) oral solution 10 mg at 3:30pm    A follow-up appointment should be arranged with your doctor    Chuy Pham    [phone]   ; call to schedule.    You should CALL YOUR PHYSICIAN if you develop:  Fever greater than 101 degrees F.  Pain not relieved by medication, or persistent nausea or vomiting.  Excessive bleeding (blood soaking through dressing) or unexpected drainage from the wound.  Extreme redness or swelling around the incision site, drainage of pus or foul smelling drainage.  Inability to urinate or empty your bladder within 8 hours.  Problems with breathing or chest pain.    You should call 911 if you develop problems with breathing or chest pain.  If you are unable to contact your doctor or surgical center, you should go to the nearest emergency room or urgent care center.  Physician's telephone #:     [phone]       MILD FLU-LIKE SYMPTOMS ARE NORMAL.  YOU MAY EXPERIENCE GENERALIZED MUSCLE ACHES, THROAT IRRITATION, HEADACHE AND/OR SOME NAUSEA.    If any questions arise, call your doctor.  If your doctor is not available, please feel free to call the Surgical Center at [phone].  The Center is open Monday through Friday from 7AM to 7PM.      A registered nurse may call you a few days after your surgery to see how you are doing after your procedure.    You may also receive a survey in the mail within the next two weeks and we ask that you take a few moments to complete the survey and return it to us.  Our goal is to provide you with very good care and we value your comments.     Depression / Suicide Risk    As you are discharged from this [facility] facility, it is important to learn how to keep safe from harming yourself.    Recognize the warning signs:  Abrupt changes in personality, positive or negative- including increase in energy   Giving away possessions  Change in eating patterns- significant weight changes-  positive or negative  Change in sleeping patterns- unable to sleep or sleeping all the time   Unwillingness or inability to communicate  Depression  Unusual sadness, discouragement and loneliness  Talk of wanting to die  Neglect of personal appearance   Rebelliousness- reckless behavior  Withdrawal from people/activities they love  Confusion- inability to concentrate     If you or a loved one observes any of these behaviors or has concerns about self-harm, here's what you can do:  Talk about it- your feelings and reasons for harming yourself  Remove any means that you might use to hurt yourself (examples: pills, rope, extension cords, firearm)  Get professional help from the community (Mental Health, Substance Abuse, psychological counseling)  Do not be alone:Call your Safe Contact- someone whom you trust who will be there for you.  Call your local CRISIS HOTLINE 885-6145 or  [phone]  Call your local Children's Mobile Crisis Response Team [place] (438) 090-3181 or www.[employer].ContractRoom  Call the toll free National Suicide Prevention Hotlines   National Suicide Prevention Lifeline 143-594-HKHW (8789)  [facility] Line Network 800-SUICIDE (373-3296)    I acknowledge receipt and understanding of these Home Care instructions.

## 2023-12-16 NOTE — OR NURSING
"Pt arrives from OR to PACU at 1509 . Pt identification verified by team, pt placed on all monitors with alarms audible, report and care of pt received from Anesthesiologist and RN. Assessment completed, pt changed into hospital gown and provided with warm blankets.  Pt did require pain medication: 10 oxy, 100 fentanyl, and 0.4 of dilaudid immediately after arriving to PACU.  Pt continued to state, \" he needs a beer\" and if I had \"any xanax to give him\".   Pt also asked this RN if he can drink beer while taking pain medication.  This RN educated pt that he absolutely cannot mix narcotics and alcohol together as the result can be fatal.  His mother was notified and updated. I  advised his mother that she monitor the narcotic prescription at home and advise him not to drink alcohol with the narcotics.  Dr. Parikh re pt's pain levels.  Report given to Julia ANGEL   "

## 2025-03-23 ENCOUNTER — APPOINTMENT (OUTPATIENT)
Dept: URGENT CARE | Facility: CLINIC | Age: 30
End: 2025-03-23
Payer: COMMERCIAL

## (undated) DEVICE — SET LEADWIRE 5 LEAD BEDSIDE DISPOSABLE ECG (1SET OF 5/EA)

## (undated) DEVICE — PAD PREP 24 X 48 CUFFED - (100/CA)

## (undated) DEVICE — BLADE SURGICAL CLIPPER - (50EA/CA)

## (undated) DEVICE — BIT DRILL L135MM DIA2MM SCALED ADD ON FITTING

## (undated) DEVICE — SUTURE 2-0 VICRYL PLUS CT-1 36 (36PK/BX)"

## (undated) DEVICE — CLOSURE PRINEO SKIN - (2EA/BX)

## (undated) DEVICE — SET EXTENSION WITH 2 PORTS (48EA/CA) ***PART #2C8610 IS A SUBSTITUTE*****

## (undated) DEVICE — TOURNIQUET CUFF 34 X 4 ONE PORT DISP - STERILE (10/BX)

## (undated) DEVICE — GOWN SURGICAL XX-LARGE - (28EA/CA) SIRUS NON REINFORCED

## (undated) DEVICE — SODIUM CHL IRRIGATION 0.9% 1000ML (12EA/CA)

## (undated) DEVICE — SUCTION TIP STRAIGHT ARGYLE - 50EA/CA

## (undated) DEVICE — CANISTER SUCTION 3000ML MECHANICAL FILTER AUTO SHUTOFF MEDI-VAC NONSTERILE LF DISP  (40EA/CA)

## (undated) DEVICE — SUCTION INSTRUMENT YANKAUER BULBOUS TIP W/O VENT (50EA/CA)

## (undated) DEVICE — GOWN WARMING STANDARD FLEX - (30/CA)

## (undated) DEVICE — BANDAGE ROLL STERILE BULKEE 4.5 IN X 4 YD (100EA/CA)

## (undated) DEVICE — SENSOR OXIMETER ADULT SPO2 RD SET (20EA/BX)

## (undated) DEVICE — SUTURE GENERAL

## (undated) DEVICE — DRAPE C-ARM LARGE 41IN X 74 IN - (10/BX 2BX/CA)

## (undated) DEVICE — Device

## (undated) DEVICE — ELECTRODE DUAL RETURN W/ CORD - (50/PK)

## (undated) DEVICE — TUBING CLEARLINK DUO-VENT - C-FLO (48EA/CA)

## (undated) DEVICE — SLEEVE VASO CALF MED - (10PR/CA)

## (undated) DEVICE — DRILL SURGICAL L122MM DIA2.7MM ADD ON FITTING VARIAX

## (undated) DEVICE — TOWEL STOP TIMEOUT SAFETY FLAG (40EA/CA)

## (undated) DEVICE — SUTURE 0 VICRYL PLUS CT-1 - 36 INCH (36/BX)

## (undated) DEVICE — COVER LIGHT HANDLE ALC PLUS DISP (18EA/BX)

## (undated) DEVICE — SPLINT OCL

## (undated) DEVICE — TUBING C&T SET FLYING LEADS DRAIN TUBING (10EA/BX)

## (undated) DEVICE — GLOVE BIOGEL SZ 8.5 SURGICAL PF LTX - (50PR/BX 4BX/CA)

## (undated) DEVICE — PACK LOWER EXTREMITY - (2/CA)

## (undated) DEVICE — GLOVE BIOGEL ECLIPSE PF LATEX SIZE 8.5 (50PR/BX)

## (undated) DEVICE — BLADE SCREWDRIVER AO COUPLING T10 FITTING SELF RETAINING

## (undated) DEVICE — CHLORAPREP 26 ML APPLICATOR - ORANGE TINT(25/CA)

## (undated) DEVICE — LACTATED RINGERS INJ 1000 ML - (14EA/CA 60CA/PF)

## (undated) DEVICE — BANDAGE ELASTIC 6 HONEYCOMB - 6X5YD LF (20/CA)"

## (undated) DEVICE — BIT DRILL L122MM OD3.5MM NONSTERILE OVER ADD ON FITTING